# Patient Record
Sex: MALE | Race: BLACK OR AFRICAN AMERICAN | Employment: FULL TIME | URBAN - METROPOLITAN AREA
[De-identification: names, ages, dates, MRNs, and addresses within clinical notes are randomized per-mention and may not be internally consistent; named-entity substitution may affect disease eponyms.]

---

## 2019-12-03 ENCOUNTER — HOSPITAL ENCOUNTER (EMERGENCY)
Age: 29
Discharge: HOME | End: 2019-12-03
Attending: EMERGENCY MEDICINE
Payer: SELF-PAY

## 2019-12-03 ENCOUNTER — APPOINTMENT (OUTPATIENT)
Dept: GENERAL RADIOLOGY | Age: 29
End: 2019-12-03
Attending: PHYSICIAN ASSISTANT
Payer: SELF-PAY

## 2019-12-03 VITALS
HEIGHT: 71 IN | WEIGHT: 146.31 LBS | SYSTOLIC BLOOD PRESSURE: 128 MMHG | OXYGEN SATURATION: 93 % | TEMPERATURE: 97.5 F | DIASTOLIC BLOOD PRESSURE: 70 MMHG | HEART RATE: 80 BPM | BODY MASS INDEX: 20.48 KG/M2 | RESPIRATION RATE: 16 BRPM

## 2019-12-03 DIAGNOSIS — J45.21 MILD INTERMITTENT ASTHMA WITH ACUTE EXACERBATION: Primary | ICD-10-CM

## 2019-12-03 DIAGNOSIS — J06.9 UPPER RESPIRATORY TRACT INFECTION, UNSPECIFIED TYPE: ICD-10-CM

## 2019-12-03 DIAGNOSIS — J45.20 MILD INTERMITTENT ASTHMA WITHOUT COMPLICATION: ICD-10-CM

## 2019-12-03 PROCEDURE — 74011250636 HC RX REV CODE- 250/636: Performed by: PHYSICIAN ASSISTANT

## 2019-12-03 PROCEDURE — 77030029684 HC NEB SM VOL KT MONA -A

## 2019-12-03 PROCEDURE — 94640 AIRWAY INHALATION TREATMENT: CPT

## 2019-12-03 PROCEDURE — 71046 X-RAY EXAM CHEST 2 VIEWS: CPT

## 2019-12-03 PROCEDURE — 96372 THER/PROPH/DIAG INJ SC/IM: CPT

## 2019-12-03 PROCEDURE — 99283 EMERGENCY DEPT VISIT LOW MDM: CPT

## 2019-12-03 PROCEDURE — 74011250637 HC RX REV CODE- 250/637: Performed by: PHYSICIAN ASSISTANT

## 2019-12-03 PROCEDURE — 74011000250 HC RX REV CODE- 250: Performed by: PHYSICIAN ASSISTANT

## 2019-12-03 RX ORDER — IPRATROPIUM BROMIDE AND ALBUTEROL SULFATE 2.5; .5 MG/3ML; MG/3ML
3 SOLUTION RESPIRATORY (INHALATION)
Status: COMPLETED | OUTPATIENT
Start: 2019-12-03 | End: 2019-12-03

## 2019-12-03 RX ORDER — AZITHROMYCIN 250 MG/1
TABLET, FILM COATED ORAL
Qty: 6 TAB | Refills: 0 | Status: SHIPPED | OUTPATIENT
Start: 2019-12-03 | End: 2020-07-05

## 2019-12-03 RX ORDER — ALBUTEROL SULFATE 0.83 MG/ML
2.5 SOLUTION RESPIRATORY (INHALATION)
Qty: 30 NEBULE | Refills: 0 | Status: SHIPPED | OUTPATIENT
Start: 2019-12-03

## 2019-12-03 RX ORDER — ALBUTEROL SULFATE 90 UG/1
1 AEROSOL, METERED RESPIRATORY (INHALATION)
Qty: 1 INHALER | Refills: 0 | Status: SHIPPED | OUTPATIENT
Start: 2019-12-03

## 2019-12-03 RX ORDER — PREDNISONE 10 MG/1
TABLET ORAL
Qty: 21 TAB | Refills: 0 | Status: SHIPPED | OUTPATIENT
Start: 2019-12-04

## 2019-12-03 RX ORDER — DEXAMETHASONE SODIUM PHOSPHATE 100 MG/10ML
10 INJECTION INTRAMUSCULAR; INTRAVENOUS ONCE
Status: COMPLETED | OUTPATIENT
Start: 2019-12-03 | End: 2019-12-03

## 2019-12-03 RX ORDER — NEBULIZER AND COMPRESSOR
1 EACH MISCELLANEOUS
Qty: 1 EACH | Refills: 0 | Status: SHIPPED | OUTPATIENT
Start: 2019-12-03

## 2019-12-03 RX ORDER — ALBUTEROL SULFATE 90 UG/1
1 AEROSOL, METERED RESPIRATORY (INHALATION)
Status: COMPLETED | OUTPATIENT
Start: 2019-12-03 | End: 2019-12-03

## 2019-12-03 RX ORDER — IBUPROFEN 400 MG/1
800 TABLET ORAL
Status: COMPLETED | OUTPATIENT
Start: 2019-12-03 | End: 2019-12-03

## 2019-12-03 RX ADMIN — IPRATROPIUM BROMIDE AND ALBUTEROL SULFATE 3 ML: .5; 3 SOLUTION RESPIRATORY (INHALATION) at 21:03

## 2019-12-03 RX ADMIN — DEXAMETHASONE SODIUM PHOSPHATE 10 MG: 10 INJECTION INTRAMUSCULAR; INTRAVENOUS at 20:55

## 2019-12-03 RX ADMIN — IBUPROFEN 800 MG: 400 TABLET ORAL at 20:55

## 2019-12-03 RX ADMIN — ALBUTEROL SULFATE 1 PUFF: 90 AEROSOL, METERED RESPIRATORY (INHALATION) at 21:44

## 2019-12-03 NOTE — LETTER
[facility] EMERGENCY DEPT 
[address]  [address] 
[phone] Work/School Note Date: 12/3/2019 To Whom It May concern: 
 
Paul Whittington was seen and treated today in the emergency room by the following provider(s): 
Attending Provider: Nikolai Romero MD 
Physician Assistant: Hannah Stout PA-C. Paul Whittington may return to work on 12/5/19. Sincerely, Maia Lozano

## 2019-12-04 NOTE — DISCHARGE INSTRUCTIONS
Patient Education     Learning About Asthma Triggers  What are triggers? When you have asthma, certain things can make your symptoms worse. These are called triggers. They include:  · Cigarette smoke or air pollution. · Things you are allergic to, such as:  ¨ Pollen, mold, or dust mites. ¨ Pet hair, skin, or saliva. · Illnesses, like colds, flu, or pneumonia. · Exercise. · Dry, cold air. How do triggers affect asthma? Triggers can make it harder for your lungs to work as they should and can lead to sudden difficulty breathing and other symptoms. When you are around a trigger, an asthma attack is more likely. If your symptoms are severe, you may need emergency treatment or have to go to the hospital for treatment. If you know what your triggers are and can avoid them, you may be able to prevent asthma attacks, reduce how often you have them, and make them less severe. What can you do to avoid triggers? The first thing is to know your triggers. When you are having symptoms, note the things around you that might be causing them. Then look for patterns in what may be triggering your symptoms. Record your triggers on a piece of paper or in an asthma diary. When you have your list of possible triggers, work with your doctor to find ways to avoid them. You also can check how well your lungs are working by measuring your peak expiratory flow (PEF) throughout the day. Your PEF may drop when you are near things that trigger symptoms. Here are some ways to avoid a few common triggers. · Do not smoke or allow others to smoke around you. If you need help quitting, talk to your doctor about stop-smoking programs and medicines. These can increase your chances of quitting for good. · If there is a lot of pollution, pollen, or dust outside, stay at home and keep your windows closed. Use an air conditioner or air filter in your home.  Check your local weather report or newspaper for air quality and pollen reports. · Get a flu shot every year. Talk to your doctor about getting a pneumococcal shot. Wash your hands often to prevent infections. · Avoid exercising outdoors in cold weather. If you are outdoors in cold weather, wear a scarf around your face and breathe through your nose. How can you manage an asthma attack? · If you have an asthma action plan, follow the plan. In general:  ¨ Use your quick-relief inhaler as directed by your doctor. If your symptoms do not get better after you use your medicine, have someone take you to the emergency room. Call an ambulance if needed. ¨ If your doctor has given you other inhaled medicines or steroid pills, take them as directed. Where can you learn more? Go to PlanHQ.be  Enter M564 in the search box to learn more about \"Learning About Asthma Triggers. \"   © 6449-6376 Healthwise, Incorporated. Care instructions adapted under license by [facility] (which disclaims liability or warranty for this information). This care instruction is for use with your licensed healthcare professional. If you have questions about a medical condition or this instruction, always ask your healthcare professional. [place] [address] any warranty or liability for your use of this information. Content Version: 78.1.151301;  Last Revised: February 23, 2012

## 2019-12-04 NOTE — ED PROVIDER NOTES
EMERGENCY DEPARTMENT HISTORY AND PHYSICAL EXAM      Date: 12/3/2019  Patient Name: Stephanie Casillas    History of Presenting Illness     Chief Complaint   Patient presents with    Cold Symptoms     History Provided By: Patient    HPI: Stephanie Casillas, 34 y.o. male with asthma, tobacco abuse who presents ambulatory to the ED with cc of acute moderate persistent productive cough, chest tightness, shortness of breath, wheezing, body aches, chills, congestion X 4 days. Over-the-counter NyQuil and [facility] cold relief without relief. Last dose of NyQuil was just prior to arrival this evening. He endorses that he does not have an inhaler at home, but does endorse that he uses other peoples inhalers. No albuterol usage today. Denies known fever, chest pain, hemoptysis, sore throat, ear pain, lightheadedness, dizziness, palpitations, leg pain or swelling, abdominal pain, nausea, vomiting, lethargy, neck pain or stiffness. Patient denies receiving influenza vaccination this year. PCP: Evelyn Ivory MD    There are no other complaints, changes, or physical findings at this time. No current facility-administered medications on file prior to encounter. Current Outpatient Medications on File Prior to Encounter   Medication Sig Dispense Refill    cholecalciferol, VITAMIN D3, (VITAMIN D3) 5,000 unit tab tablet Take 1 Tab by mouth daily. Indications: VITAMIN D DEFICIENCY (HIGH DOSE THERAPY) 90 Tab 3    [DISCONTINUED] albuterol (PROVENTIL HFA, VENTOLIN HFA, PROAIR HFA) 90 mcg/actuation inhaler Take 1 Puff by inhalation every four (4) hours as needed for Wheezing. Indications: Acute Asthma Attack 1 Inhaler 3     Past History     Past Medical History:  Past Medical History:   Diagnosis Date    Asthma      Past Surgical History:  No past surgical history on file.   Family History:  Family History   Problem Relation Age of Onset    Diabetes Mother      Social History:  Social History     Tobacco Use    Smoking status: Former Smoker     Packs/day: 0.25     Last attempt to quit: 8/3/2016     Years since quitting: 3.3   Substance Use Topics    Alcohol use: Yes    Drug use: Yes     Types: Marijuana     Allergies:  No Known Allergies  Review of Systems   Review of Systems   Constitutional: Positive for chills and fatigue. Negative for activity change, appetite change, diaphoresis, fever and unexpected weight change. HENT: Positive for congestion and rhinorrhea. Negative for ear discharge, ear pain, mouth sores, sinus pressure, sinus pain, sneezing, sore throat, trouble swallowing and voice change. Eyes: Negative for visual disturbance. Respiratory: Positive for cough, chest tightness, shortness of breath and wheezing. Cardiovascular: Negative for chest pain, palpitations and leg swelling. Gastrointestinal: Negative for abdominal pain, constipation, diarrhea, nausea and vomiting. Genitourinary: Negative for decreased urine volume. Musculoskeletal: Positive for myalgias. Negative for arthralgias, back pain, neck pain and neck stiffness. Skin: Negative for color change and rash. Neurological: Negative for dizziness, syncope, weakness, light-headedness, numbness and headaches. Physical Exam   Physical Exam  Vitals signs and nursing note reviewed. Constitutional:       General: He is not in acute distress. Appearance: He is well-developed. He is not diaphoretic. HENT:      Head: Normocephalic and atraumatic. Right Ear: Hearing, tympanic membrane, ear canal and external ear normal.      Left Ear: Hearing, tympanic membrane, ear canal and external ear normal.      Nose: Mucosal edema and congestion present. No rhinorrhea. Right Sinus: No maxillary sinus tenderness or frontal sinus tenderness. Left Sinus: No maxillary sinus tenderness or frontal sinus tenderness. Mouth/Throat:      Mouth: Mucous membranes are moist.      Pharynx: Uvula midline.  No oropharyngeal exudate or posterior oropharyngeal erythema. Tonsils: No tonsillar abscesses. Eyes:      Conjunctiva/sclera: Conjunctivae normal.      Pupils: Pupils are equal, round, and reactive to light. Neck:      Musculoskeletal: Normal range of motion. Cardiovascular:      Rate and Rhythm: Normal rate and regular rhythm. Heart sounds: Normal heart sounds. Pulmonary:      Effort: Pulmonary effort is normal. No tachypnea, accessory muscle usage or respiratory distress. Breath sounds: Wheezing (Diffuse bilateral expiratory wheezing.) present. No decreased breath sounds, rhonchi or rales. Comments: Speaking in clear complete sentences. NAD. Mild intermittent dry cough. Abdominal:      General: Bowel sounds are normal. There is no distension. Palpations: Abdomen is soft. Abdomen is not rigid. Tenderness: There is no tenderness. There is no guarding or rebound. Negative signs include Crum's sign and McBurney's sign. Musculoskeletal: Normal range of motion. Skin:     General: Skin is warm and dry. Coloration: Skin is not pale. Neurological:      Mental Status: He is alert and oriented to person, place, and time. He is not disoriented. GCS: GCS eye subscore is 4. GCS verbal subscore is 5. GCS motor subscore is 6. Cranial Nerves: No cranial nerve deficit. Sensory: No sensory deficit. Motor: No tremor or seizure activity. Psychiatric:         Speech: Speech normal.         Behavior: Behavior normal.         Thought Content: Thought content normal.         Judgment: Judgment normal.       Diagnostic Study Results   Labs -   No results found for this or any previous visit (from the past 12 hour(s)). Radiologic Studies -   XR CHEST PA LAT   Final Result   IMPRESSION: Normal chest x-ray. Xr Chest Pa Lat    Result Date: 12/3/2019  IMPRESSION: Normal chest x-ray. Medical Decision Making   I am the first provider for this patient.     I reviewed the vital signs, available nursing notes, past medical history, past surgical history, family history and social history. Vital Signs-Reviewed the patient's vital signs. Patient Vitals for the past 12 hrs:   Temp Pulse Resp BP SpO2   12/03/19 2038 97.5 °F (36.4 °C) 80 16 132/69 91 %     Pulse Oximetry Analysis - 91% on RA    Records Reviewed: Nursing Notes, Old Medical Records, Previous Radiology Studies and Previous Laboratory Studies    Provider Notes (Medical Decision Making):   Patient presents with SOB and wheezing. DDx: flu, asthma, copd, pulmonary edema, acute bronchitis, ACS, pna. Will treat with nebs and solumedrol PRN and obtain labs, EKG and CXR PRN    10:01 PM  The patient has been re-examined after nebulizer treatments and states that they are feeling better and have no new complaints. On auscultation, wheezing is significantly improved. Laboratory tests, medications, x-rays, diagnosis, follow up plan and return instructions have been reviewed and discussed with the patient. The patient has had the opportunity to ask questions about their care. The patient expresses understanding and agreement with diagnosis, care plan; including oral steroids, nebulizer or inhaler use, follow up and return instructions. The patient agrees to return in 24 hours if their symptoms are not improving or immediately if they have any change in their condition including worsening wheezing or any signs of increasing work of breathing. Pt's symptom onset >72hrs pta. No antiviral therapy recommended at this time. No influenza screening indicated. ED Course:   Initial assessment performed. The patients presenting problems have been discussed, and they are in agreement with the care plan formulated and outlined with them. I have encouraged them to ask questions as they arise throughout their visit. ED Course as of Dec 03 2201   Tue Dec 03, 2019   2138 Pt resting comfortably in room in NAD. No new symptoms or complaints at this time. Available labs/ results reviewed with pt. Pulse oximetry on room air is 97%. Pt endorses he feels much better and would like to be discharged. Wheezing improved in bilateral lung fields. [SM]   2200 Pt sleeping in room in NAD. [SM]      ED Course User Index  [SM] Don Santamaria PA-C       Progress Note:   Updated pt on all returned results and findings. Discussed the importance of proper follow up as referred below along with return precautions. Pt in agreement with the care plan and expresses agreement with and understanding of all items discussed. Disposition:  10:01 PM  I have discussed with patient their diagnosis, treatment, and follow up plan. The patient agrees to follow up as outlined in discharge paperwork and also to return to the ED with any worsening. Cirilo Wright PA-C      PLAN:  1. Current Discharge Medication List      START taking these medications    Details   azithromycin (ZITHROMAX) 250 mg tablet Take two tablets today then one tablet daily  Qty: 6 Tab, Refills: 0      albuterol (PROVENTIL VENTOLIN) 2.5 mg /3 mL (0.083 %) nebu 3 mL by Nebulization route every four (4) hours as needed for Cough. Qty: 30 Nebule, Refills: 0      Nebulizer & Compressor machine 1 Each by Does Not Apply route every four to six (4-6) hours as needed for Cough. Qty: 1 Each, Refills: 0      predniSONE (STERAPRED DS) 10 mg dose pack See administration instruction per 10mg dose pack  Qty: 21 Tab, Refills: 0         CONTINUE these medications which have CHANGED    Details   albuterol (PROVENTIL HFA, VENTOLIN HFA, PROAIR HFA) 90 mcg/actuation inhaler Take 1 Puff by inhalation every four (4) hours as needed for Wheezing. Indications: Asthma Attack  Qty: 1 Inhaler, Refills: 0    Associated Diagnoses: Mild intermittent asthma without complication         CONTINUE these medications which have NOT CHANGED    Details   cholecalciferol, VITAMIN D3, (VITAMIN D3) 5,000 unit tab tablet Take 1 Tab by mouth daily. Indications: VITAMIN D DEFICIENCY (HIGH DOSE THERAPY)  Qty: 90 Tab, Refills: 3    Associated Diagnoses: Hypovitaminosis D           2. Follow-up Information     Follow up With Specialties Details Why Contact Info    Kiesha He MD Internal Medicine Schedule an appointment as soon as possible for a visit in 3 days As needed [address] [facility] [address]  [address]  [phone]          Return to ED if worse     Diagnosis     Clinical Impression:   1. Mild intermittent asthma with acute exacerbation    2. Upper respiratory tract infection, unspecified type    3. Mild intermittent asthma without complication            Please note that this dictation was completed with Dragon, computer voice recognition software. Quite often unanticipated grammatical, syntax, homophones, and other interpretive errors are inadvertently transcribed by the computer software. Please disregard these errors. Additionally, please excuse any errors that have escaped final proofreading. stated

## 2019-12-04 NOTE — ED NOTES
Pt reporting wheezing, chills, sore throat, generalized body aches x4days. Pt 91%-93% on room air. He does not have nebs or MDI at home. Safety precautions in place, call bell within reach, bed locked and in lowest position. Shortness of breath: Yes  Dyspnea during exertion: Yes  Chest rise/fall: Equal; symmetrical  Lung sounds: Expiratory wheezing all posterior lobes  Capillary refill: <3secs all extremities x4  Pertinent hx: Asthma      Emergency Department Nursing Plan of Care       The Nursing Plan of Care is developed from the Nursing assessment and Emergency Department Attending provider initial evaluation. The plan of care may be reviewed in the ED Provider note.     The Plan of Care was developed with the following considerations:   Patient / Family readiness to learn indicated by:verbalized understanding  Persons(s) to be included in education: patient  Barriers to Learning/Limitations:No    Signed     Maria A Chamorro RN    12/3/2019   10:26 PM

## 2019-12-04 NOTE — ED NOTES
Patient  given copy of dc instructions and 5 script(s). Patient  verbalized understanding of instructions and script (s). Patient given a current medication reconciliation form and verbalized understanding of their medications. Patient  verbalized understanding of the importance of discussing medications with  his or her physician or clinic they will be following up with. Patient alert and oriented and in no acute distress. Patient discharged home ambulatory .

## 2020-07-05 ENCOUNTER — APPOINTMENT (OUTPATIENT)
Dept: VASCULAR SURGERY | Age: 30
End: 2020-07-05
Attending: EMERGENCY MEDICINE
Payer: COMMERCIAL

## 2020-07-05 ENCOUNTER — APPOINTMENT (OUTPATIENT)
Dept: GENERAL RADIOLOGY | Age: 30
End: 2020-07-05
Attending: EMERGENCY MEDICINE
Payer: COMMERCIAL

## 2020-07-05 ENCOUNTER — HOSPITAL ENCOUNTER (EMERGENCY)
Age: 30
Discharge: HOME | End: 2020-07-05
Attending: EMERGENCY MEDICINE
Payer: COMMERCIAL

## 2020-07-05 VITALS
SYSTOLIC BLOOD PRESSURE: 133 MMHG | HEART RATE: 105 BPM | DIASTOLIC BLOOD PRESSURE: 64 MMHG | WEIGHT: 170 LBS | OXYGEN SATURATION: 96 % | RESPIRATION RATE: 18 BRPM | BODY MASS INDEX: 23.8 KG/M2 | TEMPERATURE: 99.6 F | HEIGHT: 71 IN

## 2020-07-05 DIAGNOSIS — L03.115 CELLULITIS OF RIGHT LEG: Primary | ICD-10-CM

## 2020-07-05 LAB
ANION GAP SERPL CALC-SCNC: 8 MMOL/L (ref 5–15)
BASOPHILS # BLD: 0 K/UL (ref 0–0.1)
BASOPHILS NFR BLD: 0 % (ref 0–1)
BUN SERPL-MCNC: 16 MG/DL (ref 6–20)
BUN/CREAT SERPL: 16 (ref 12–20)
CALCIUM SERPL-MCNC: 8.3 MG/DL (ref 8.5–10.1)
CHLORIDE SERPL-SCNC: 103 MMOL/L (ref 97–108)
CO2 SERPL-SCNC: 29 MMOL/L (ref 21–32)
CREAT SERPL-MCNC: 1 MG/DL (ref 0.7–1.3)
DIFFERENTIAL METHOD BLD: ABNORMAL
EOSINOPHIL # BLD: 0.3 K/UL (ref 0–0.4)
EOSINOPHIL NFR BLD: 3 % (ref 0–7)
ERYTHROCYTE [DISTWIDTH] IN BLOOD BY AUTOMATED COUNT: 14.3 % (ref 11.5–14.5)
GLUCOSE SERPL-MCNC: 90 MG/DL (ref 65–100)
HCT VFR BLD AUTO: 38.3 % (ref 36.6–50.3)
HGB BLD-MCNC: 12.5 G/DL (ref 12.1–17)
IMM GRANULOCYTES # BLD AUTO: 0 K/UL (ref 0–0.04)
IMM GRANULOCYTES NFR BLD AUTO: 0 % (ref 0–0.5)
LYMPHOCYTES # BLD: 2.1 K/UL (ref 0.8–3.5)
LYMPHOCYTES NFR BLD: 21 % (ref 12–49)
MCH RBC QN AUTO: 28 PG (ref 26–34)
MCHC RBC AUTO-ENTMCNC: 32.6 G/DL (ref 30–36.5)
MCV RBC AUTO: 85.7 FL (ref 80–99)
MONOCYTES # BLD: 1.2 K/UL (ref 0–1)
MONOCYTES NFR BLD: 12 % (ref 5–13)
NEUTS SEG # BLD: 6.6 K/UL (ref 1.8–8)
NEUTS SEG NFR BLD: 64 % (ref 32–75)
NRBC # BLD: 0 K/UL (ref 0–0.01)
NRBC BLD-RTO: 0 PER 100 WBC
PLATELET # BLD AUTO: 225 K/UL (ref 150–400)
PMV BLD AUTO: 8.7 FL (ref 8.9–12.9)
POTASSIUM SERPL-SCNC: 3.8 MMOL/L (ref 3.5–5.1)
RBC # BLD AUTO: 4.47 M/UL (ref 4.1–5.7)
SODIUM SERPL-SCNC: 140 MMOL/L (ref 136–145)
WBC # BLD AUTO: 10.2 K/UL (ref 4.1–11.1)

## 2020-07-05 PROCEDURE — 85025 COMPLETE CBC W/AUTO DIFF WBC: CPT

## 2020-07-05 PROCEDURE — 74011250637 HC RX REV CODE- 250/637: Performed by: EMERGENCY MEDICINE

## 2020-07-05 PROCEDURE — 87040 BLOOD CULTURE FOR BACTERIA: CPT

## 2020-07-05 PROCEDURE — 93971 EXTREMITY STUDY: CPT

## 2020-07-05 PROCEDURE — 73610 X-RAY EXAM OF ANKLE: CPT

## 2020-07-05 PROCEDURE — 73630 X-RAY EXAM OF FOOT: CPT

## 2020-07-05 PROCEDURE — 73590 X-RAY EXAM OF LOWER LEG: CPT

## 2020-07-05 PROCEDURE — 80048 BASIC METABOLIC PNL TOTAL CA: CPT

## 2020-07-05 PROCEDURE — 74011250636 HC RX REV CODE- 250/636: Performed by: EMERGENCY MEDICINE

## 2020-07-05 PROCEDURE — 99284 EMERGENCY DEPT VISIT MOD MDM: CPT

## 2020-07-05 PROCEDURE — 96374 THER/PROPH/DIAG INJ IV PUSH: CPT

## 2020-07-05 PROCEDURE — 36415 COLL VENOUS BLD VENIPUNCTURE: CPT

## 2020-07-05 RX ORDER — KETOROLAC TROMETHAMINE 30 MG/ML
15 INJECTION, SOLUTION INTRAMUSCULAR; INTRAVENOUS
Status: COMPLETED | OUTPATIENT
Start: 2020-07-05 | End: 2020-07-05

## 2020-07-05 RX ORDER — IBUPROFEN 800 MG/1
800 TABLET ORAL
Qty: 20 TAB | Refills: 0 | Status: SHIPPED | OUTPATIENT
Start: 2020-07-05 | End: 2020-07-12

## 2020-07-05 RX ORDER — ACETAMINOPHEN 500 MG
1000 TABLET ORAL ONCE
Status: COMPLETED | OUTPATIENT
Start: 2020-07-05 | End: 2020-07-05

## 2020-07-05 RX ORDER — CEPHALEXIN 500 MG/1
1000 CAPSULE ORAL 3 TIMES DAILY
Qty: 42 CAP | Refills: 0 | Status: SHIPPED | OUTPATIENT
Start: 2020-07-05 | End: 2020-07-12

## 2020-07-05 RX ADMIN — KETOROLAC TROMETHAMINE 15 MG: 30 INJECTION, SOLUTION INTRAMUSCULAR; INTRAVENOUS at 18:28

## 2020-07-05 RX ADMIN — ACETAMINOPHEN 1000 MG: 500 TABLET, FILM COATED ORAL at 18:28

## 2020-07-05 NOTE — ED NOTES
Emergency Department Nursing Plan of Care       The Nursing Plan of Care is developed from the Nursing assessment and Emergency Department Attending provider initial evaluation. The plan of care may be reviewed in the ED Provider note.     The Plan of Care was developed with the following considerations:   Patient / Family readiness to learn indicated by:verbalized understanding  Persons(s) to be included in education: patient  Barriers to Learning/Limitations:No    Signed     Andrade Car    7/5/2020   5:32 PM

## 2020-07-05 NOTE — ED PROVIDER NOTES
EMERGENCY DEPARTMENT HISTORY AND PHYSICAL EXAM      Date: 7/5/2020  Patient Name: Trenton Erazo    History of Presenting Illness     Chief Complaint   Patient presents with    Leg Swelling     right foot and lower leg x1 day without injury, pt;s foot noted to be swollen, red, and warm, pt reports 10/10 pain       History Provided By: Patient    HPI: Trenton Erazo, 27 y.o. male presents to the ED with cc of right leg pain and swelling over the past 1 to 2 days. Symptoms have progressively worsened since onset over the weekend. He is able to ambulate but with some difficulty as this does worsen his pain. He notes increased swelling mostly at the foot. He denies any fevers, chills, nausea, vomiting, or any other symptoms. No recent injury or trauma to this leg. No prior history of DVT or PE. He has not been taking anything at home for symptomatic relief. There are no other complaints, changes, or physical findings at this time. PCP: Erika Dorsey MD    No current facility-administered medications on file prior to encounter. Current Outpatient Medications on File Prior to Encounter   Medication Sig Dispense Refill    albuterol (PROVENTIL HFA, VENTOLIN HFA, PROAIR HFA) 90 mcg/actuation inhaler Take 1 Puff by inhalation every four (4) hours as needed for Wheezing. Indications: Asthma Attack 1 Inhaler 0    albuterol (PROVENTIL VENTOLIN) 2.5 mg /3 mL (0.083 %) nebu 3 mL by Nebulization route every four (4) hours as needed for Cough. 30 Nebule 0    Nebulizer & Compressor machine 1 Each by Does Not Apply route every four to six (4-6) hours as needed for Cough. 1 Each 0    predniSONE (STERAPRED DS) 10 mg dose pack See administration instruction per 10mg dose pack 21 Tab 0    [DISCONTINUED] azithromycin (ZITHROMAX) 250 mg tablet Take two tablets today then one tablet daily 6 Tab 0    cholecalciferol, VITAMIN D3, (VITAMIN D3) 5,000 unit tab tablet Take 1 Tab by mouth daily.  Indications: VITAMIN D DEFICIENCY (HIGH DOSE THERAPY) 90 Tab 3       Past History     Past Medical History:  Past Medical History:   Diagnosis Date    Asthma        Past Surgical History:  History reviewed. No pertinent surgical history. Family History:  Family History   Problem Relation Age of Onset    Diabetes Mother        Social History:  Social History     Tobacco Use    Smoking status: Former Smoker     Packs/day: 0.25     Last attempt to quit: 8/3/2016     Years since quitting: 3.9   Substance Use Topics    Alcohol use: Yes    Drug use: Yes     Types: Marijuana       Allergies:  No Known Allergies      Review of Systems   Review of Systems   Constitutional: Negative for chills and fever. Respiratory: Negative for cough and shortness of breath. Cardiovascular: Positive for leg swelling. Gastrointestinal: Negative for nausea and vomiting. Skin: Positive for color change. Neurological: Negative for weakness and numbness. All other systems reviewed and are negative. Physical Exam   Physical Exam  Vitals signs and nursing note reviewed. Constitutional:       General: He is not in acute distress. Appearance: Normal appearance. He is not ill-appearing, toxic-appearing or diaphoretic. HENT:      Head: Normocephalic and atraumatic. Cardiovascular:      Rate and Rhythm: Regular rhythm. Tachycardia present. Heart sounds: Normal heart sounds. No murmur. Pulmonary:      Effort: Pulmonary effort is normal. No respiratory distress. Breath sounds: Normal breath sounds. No wheezing. Abdominal:      Palpations: Abdomen is soft. Tenderness: There is no abdominal tenderness. There is no guarding or rebound. Musculoskeletal:      Comments: Right lower extremity with mild generalized edema nonpitting with some tenderness to palpation. Mild erythema when compared to the left leg. Mild warmth when compared to the left leg. 2+ DP pulse. Neurovascular intact distally.    Skin:     General: Skin is warm and dry.      Findings: No erythema or rash. Neurological:      General: No focal deficit present. Mental Status: He is alert and oriented to person, place, and time. Diagnostic Study Results     Labs -  Labs Reviewed   CBC WITH AUTOMATED DIFF - Abnormal; Notable for the following components:       Result Value    MPV 8.7 (*)     ABS. MONOCYTES 1.2 (*)     All other components within normal limits   METABOLIC PANEL, BASIC - Abnormal; Notable for the following components:    Calcium 8.3 (*)     All other components within normal limits   CULTURE, BLOOD, PAIRED       Radiologic Studies -   No orders to display     CT Results  (Last 48 hours)    None        CXR Results  (Last 48 hours)    None          Medical Decision Making   I am the first provider for this patient. I reviewed the vital signs, available nursing notes, past medical history, past surgical history, family history and social history. Vital Signs-Reviewed the patient's vital signs. Patient Vitals for the past 12 hrs:   Temp Pulse Resp BP SpO2   07/05/20 1723     96 %   07/05/20 1723  (!) 105      07/05/20 1709 99.6 °F (37.6 °C) (!) 124 18 133/64 94 %       Records Reviewed: Nursing Notes    Provider Notes (Medical Decision Making):   77-year-old male here with asymmetric right leg pain and swelling, nontraumatic. On examination patient clinically appears well and nontoxic. He is noted to have mild tachycardia at 105 bpm, low-grade temperature 99.6 °F.  Remainder vital signs unremarkable. He has no complaints of chest pain or shortness of breath. Plain films of right lower extremity foot, ankle, tib-fib unremarkable for foreign body, subcutaneous gas, or bony abnormality. Ultrasound DVT study negative for DVT. There is no significant leukocytosis or other abnormality on blood work.   Symptoms likely consistent with cellulitis and patient will be treated with outpatient antibiotics, encouraged follow-up closely with PCP and given strict return ED precautions. All questions answered and he agrees plan as above. ED Course:   Initial assessment performed. The patients presenting problems have been discussed, and they are in agreement with the care plan formulated and outlined with them. I have encouraged them to ask questions as they arise throughout their visit. Discharge Note:  The patient has been re-evaluated and is ready for discharge. Reviewed available results with patient. Counseled patient on diagnosis and care plan. Patient has expressed understanding, and all questions have been answered. Patient agrees with plan and agrees to follow up as recommended, or to return to the ED if their symptoms worsen. Discharge instructions have been provided and explained to the patient, along with reasons to return to the ED. Disposition:  Discharge home    DISCHARGE PLAN:  1. Current Discharge Medication List        2. Follow-up Information    None       3. Return to ED if worse     Diagnosis     Clinical Impression: No diagnosis found. Attestations:    Hailee Ortega MD    Please note that this dictation was completed with [employer], the computer voice recognition software. Quite often unanticipated grammatical, syntax, homophones, and other interpretive errors are inadvertently transcribed by the computer software. Please disregard these errors. Please excuse any errors that have escaped final proofreading. Thank you.

## 2020-07-05 NOTE — ED NOTES
Patient complains of right lower leg swelling x 2 days. Complains of pain especially with ambulating. Denies smoking or sedentary lifestyle. Denies hx of blood clots. Denies fever or chills. Patient is alert and oriented x 4 and in no acute distress at this time. Respirations are at a regular rate, depth, and pattern. Patient updated on plan of care and has no questions or concerns at this time. Call bell within reach. Will continue to monitor. Please reference nursing assessment. See PVS assessment.

## 2020-07-05 NOTE — ED NOTES
Discharge instructions were given to the patient by JANNETTE Calvo RN. .     The patient left the Emergency Department ambulatory, alert and oriented and in no acute distress with 2 prescription(s). The patient was encouraged to call or return to the ED for worsening symptoms or problems and was encouraged to schedule a follow up appointment for continuing care. Patient leaving ED accompanied by self. The patient verbalized understanding of discharge instructions and prescriptions, all questions were answered. The patient has no further concerns at this time. Patient declined wheelchair transfer upon ED discharge.

## 2020-07-10 LAB
BACTERIA SPEC CULT: NORMAL
SERVICE CMNT-IMP: NORMAL

## 2022-12-31 ENCOUNTER — HOSPITAL ENCOUNTER (INPATIENT)
Age: 32
LOS: 1 days | Discharge: LEFT BEFORE DISCHARGE | DRG: 720 | End: 2023-01-01
Attending: STUDENT IN AN ORGANIZED HEALTH CARE EDUCATION/TRAINING PROGRAM | Admitting: INTERNAL MEDICINE
Payer: COMMERCIAL

## 2022-12-31 ENCOUNTER — APPOINTMENT (OUTPATIENT)
Dept: GENERAL RADIOLOGY | Age: 32
DRG: 720 | End: 2022-12-31
Attending: PHYSICIAN ASSISTANT
Payer: COMMERCIAL

## 2022-12-31 DIAGNOSIS — Z72.0 TOBACCO ABUSE: ICD-10-CM

## 2022-12-31 DIAGNOSIS — A41.9 SEPSIS WITH ACUTE HYPOXIC RESPIRATORY FAILURE WITHOUT SEPTIC SHOCK, DUE TO UNSPECIFIED ORGANISM (HCC): Primary | ICD-10-CM

## 2022-12-31 DIAGNOSIS — R65.20 SEPSIS WITH ACUTE HYPOXIC RESPIRATORY FAILURE WITHOUT SEPTIC SHOCK, DUE TO UNSPECIFIED ORGANISM (HCC): Primary | ICD-10-CM

## 2022-12-31 DIAGNOSIS — J45.21 MILD INTERMITTENT ASTHMA WITH ACUTE EXACERBATION: ICD-10-CM

## 2022-12-31 DIAGNOSIS — J96.01 SEPSIS WITH ACUTE HYPOXIC RESPIRATORY FAILURE WITHOUT SEPTIC SHOCK, DUE TO UNSPECIFIED ORGANISM (HCC): Primary | ICD-10-CM

## 2022-12-31 DIAGNOSIS — J45.20 MILD INTERMITTENT ASTHMA WITHOUT COMPLICATION: ICD-10-CM

## 2022-12-31 DIAGNOSIS — J18.9 PNEUMONIA OF BOTH LUNGS DUE TO INFECTIOUS ORGANISM, UNSPECIFIED PART OF LUNG: ICD-10-CM

## 2022-12-31 LAB
ALBUMIN SERPL-MCNC: 3 G/DL (ref 3.5–5)
ALBUMIN/GLOB SERPL: 0.6 (ref 1.1–2.2)
ALP SERPL-CCNC: 126 U/L (ref 45–117)
ALT SERPL-CCNC: 48 U/L (ref 12–78)
AMPHET UR QL SCN: NEGATIVE
ANION GAP SERPL CALC-SCNC: 9 MMOL/L (ref 5–15)
APPEARANCE UR: CLEAR
AST SERPL-CCNC: 31 U/L (ref 15–37)
BACTERIA URNS QL MICRO: NEGATIVE /HPF
BARBITURATES UR QL SCN: NEGATIVE
BASOPHILS # BLD: 0 K/UL (ref 0–0.1)
BASOPHILS NFR BLD: 0 % (ref 0–1)
BENZODIAZ UR QL: NEGATIVE
BILIRUB SERPL-MCNC: 0.4 MG/DL (ref 0.2–1)
BILIRUB UR QL CFM: NEGATIVE
BUN SERPL-MCNC: 12 MG/DL (ref 6–20)
BUN/CREAT SERPL: 14 (ref 12–20)
CALCIUM SERPL-MCNC: 8.7 MG/DL (ref 8.5–10.1)
CANNABINOIDS UR QL SCN: POSITIVE
CHLORIDE SERPL-SCNC: 96 MMOL/L (ref 97–108)
CO2 SERPL-SCNC: 29 MMOL/L (ref 21–32)
COCAINE UR QL SCN: POSITIVE
COLOR UR: ABNORMAL
CREAT SERPL-MCNC: 0.84 MG/DL (ref 0.7–1.3)
DIFFERENTIAL METHOD BLD: ABNORMAL
DRUG SCRN COMMENT,DRGCM: ABNORMAL
EOSINOPHIL # BLD: 0.1 K/UL (ref 0–0.4)
EOSINOPHIL NFR BLD: 1 % (ref 0–7)
EPITH CASTS URNS QL MICRO: ABNORMAL /LPF
ERYTHROCYTE [DISTWIDTH] IN BLOOD BY AUTOMATED COUNT: 14.9 % (ref 11.5–14.5)
FLUAV RNA SPEC QL NAA+PROBE: NOT DETECTED
FLUBV RNA SPEC QL NAA+PROBE: NOT DETECTED
GLOBULIN SER CALC-MCNC: 5.3 G/DL (ref 2–4)
GLUCOSE BLD STRIP.AUTO-MCNC: 95 MG/DL (ref 65–117)
GLUCOSE SERPL-MCNC: 91 MG/DL (ref 65–100)
GLUCOSE UR STRIP.AUTO-MCNC: NEGATIVE MG/DL
HCT VFR BLD AUTO: 34.8 % (ref 36.6–50.3)
HGB BLD-MCNC: 11.7 G/DL (ref 12.1–17)
HGB UR QL STRIP: NEGATIVE
IMM GRANULOCYTES # BLD AUTO: 0.1 K/UL (ref 0–0.04)
IMM GRANULOCYTES NFR BLD AUTO: 1 % (ref 0–0.5)
INR PPP: 1.1 (ref 0.9–1.1)
KETONES UR QL STRIP.AUTO: ABNORMAL MG/DL
LACTATE SERPL-SCNC: 0.6 MMOL/L (ref 0.4–2)
LEUKOCYTE ESTERASE UR QL STRIP.AUTO: ABNORMAL
LYMPHOCYTES # BLD: 1.8 K/UL (ref 0.8–3.5)
LYMPHOCYTES NFR BLD: 16 % (ref 12–49)
MAGNESIUM SERPL-MCNC: 1.7 MG/DL (ref 1.6–2.4)
MCH RBC QN AUTO: 27.3 PG (ref 26–34)
MCHC RBC AUTO-ENTMCNC: 33.6 G/DL (ref 30–36.5)
MCV RBC AUTO: 81.3 FL (ref 80–99)
METHADONE UR QL: NEGATIVE
MONOCYTES # BLD: 1.1 K/UL (ref 0–1)
MONOCYTES NFR BLD: 9 % (ref 5–13)
NEUTS SEG # BLD: 8.7 K/UL (ref 1.8–8)
NEUTS SEG NFR BLD: 73 % (ref 32–75)
NITRITE UR QL STRIP.AUTO: NEGATIVE
NRBC # BLD: 0 K/UL (ref 0–0.01)
NRBC BLD-RTO: 0 PER 100 WBC
OPIATES UR QL: POSITIVE
PCP UR QL: NEGATIVE
PH UR STRIP: 6 (ref 5–8)
PLATELET # BLD AUTO: 428 K/UL (ref 150–400)
PMV BLD AUTO: 9.1 FL (ref 8.9–12.9)
POTASSIUM SERPL-SCNC: 3.6 MMOL/L (ref 3.5–5.1)
PROT SERPL-MCNC: 8.3 G/DL (ref 6.4–8.2)
PROT UR STRIP-MCNC: 100 MG/DL
PROTHROMBIN TIME: 11.1 SEC (ref 9–11.1)
RBC # BLD AUTO: 4.28 M/UL (ref 4.1–5.7)
RBC #/AREA URNS HPF: ABNORMAL /HPF (ref 0–5)
SARS-COV-2, COV2: NOT DETECTED
SERVICE CMNT-IMP: NORMAL
SODIUM SERPL-SCNC: 134 MMOL/L (ref 136–145)
SP GR UR REFRACTOMETRY: 1.03
TROPONIN-HIGH SENSITIVITY: 8 NG/L (ref 0–76)
UA: UC IF INDICATED,UAUC: ABNORMAL
UROBILINOGEN UR QL STRIP.AUTO: 1 EU/DL (ref 0.2–1)
WBC # BLD AUTO: 11.9 K/UL (ref 4.1–11.1)
WBC URNS QL MICRO: ABNORMAL /HPF (ref 0–4)

## 2022-12-31 PROCEDURE — 93005 ELECTROCARDIOGRAM TRACING: CPT

## 2022-12-31 PROCEDURE — 74011000250 HC RX REV CODE- 250: Performed by: PHYSICIAN ASSISTANT

## 2022-12-31 PROCEDURE — 87070 CULTURE OTHR SPECIMN AEROBIC: CPT

## 2022-12-31 PROCEDURE — 65270000032 HC RM SEMIPRIVATE

## 2022-12-31 PROCEDURE — 96365 THER/PROPH/DIAG IV INF INIT: CPT

## 2022-12-31 PROCEDURE — 74011250636 HC RX REV CODE- 250/636: Performed by: INTERNAL MEDICINE

## 2022-12-31 PROCEDURE — 96375 TX/PRO/DX INJ NEW DRUG ADDON: CPT

## 2022-12-31 PROCEDURE — 0202U NFCT DS 22 TRGT SARS-COV-2: CPT

## 2022-12-31 PROCEDURE — 74011250636 HC RX REV CODE- 250/636: Performed by: PHYSICIAN ASSISTANT

## 2022-12-31 PROCEDURE — 99285 EMERGENCY DEPT VISIT HI MDM: CPT

## 2022-12-31 PROCEDURE — 94640 AIRWAY INHALATION TREATMENT: CPT

## 2022-12-31 PROCEDURE — 81001 URINALYSIS AUTO W/SCOPE: CPT

## 2022-12-31 PROCEDURE — 80307 DRUG TEST PRSMV CHEM ANLYZR: CPT

## 2022-12-31 PROCEDURE — 85610 PROTHROMBIN TIME: CPT

## 2022-12-31 PROCEDURE — 87040 BLOOD CULTURE FOR BACTERIA: CPT

## 2022-12-31 PROCEDURE — 85025 COMPLETE CBC W/AUTO DIFF WBC: CPT

## 2022-12-31 PROCEDURE — 80053 COMPREHEN METABOLIC PANEL: CPT

## 2022-12-31 PROCEDURE — 96367 TX/PROPH/DG ADDL SEQ IV INF: CPT

## 2022-12-31 PROCEDURE — 74011250637 HC RX REV CODE- 250/637: Performed by: PHYSICIAN ASSISTANT

## 2022-12-31 PROCEDURE — 74011000250 HC RX REV CODE- 250: Performed by: INTERNAL MEDICINE

## 2022-12-31 PROCEDURE — 83605 ASSAY OF LACTIC ACID: CPT

## 2022-12-31 PROCEDURE — G0378 HOSPITAL OBSERVATION PER HR: HCPCS

## 2022-12-31 PROCEDURE — 82962 GLUCOSE BLOOD TEST: CPT

## 2022-12-31 PROCEDURE — 71045 X-RAY EXAM CHEST 1 VIEW: CPT

## 2022-12-31 PROCEDURE — 36415 COLL VENOUS BLD VENIPUNCTURE: CPT

## 2022-12-31 PROCEDURE — 83735 ASSAY OF MAGNESIUM: CPT

## 2022-12-31 PROCEDURE — 87636 SARSCOV2 & INF A&B AMP PRB: CPT

## 2022-12-31 PROCEDURE — 84484 ASSAY OF TROPONIN QUANT: CPT

## 2022-12-31 PROCEDURE — 77010033678 HC OXYGEN DAILY

## 2022-12-31 RX ORDER — SODIUM CHLORIDE, SODIUM LACTATE, POTASSIUM CHLORIDE, CALCIUM CHLORIDE 600; 310; 30; 20 MG/100ML; MG/100ML; MG/100ML; MG/100ML
75 INJECTION, SOLUTION INTRAVENOUS CONTINUOUS
Status: DISCONTINUED | OUTPATIENT
Start: 2022-12-31 | End: 2023-01-01 | Stop reason: HOSPADM

## 2022-12-31 RX ORDER — POLYETHYLENE GLYCOL 3350 17 G/17G
17 POWDER, FOR SOLUTION ORAL DAILY PRN
Status: DISCONTINUED | OUTPATIENT
Start: 2022-12-31 | End: 2023-01-01 | Stop reason: HOSPADM

## 2022-12-31 RX ORDER — IPRATROPIUM BROMIDE AND ALBUTEROL SULFATE 2.5; .5 MG/3ML; MG/3ML
3 SOLUTION RESPIRATORY (INHALATION)
Status: DISCONTINUED | OUTPATIENT
Start: 2022-12-31 | End: 2023-01-01 | Stop reason: HOSPADM

## 2022-12-31 RX ORDER — ONDANSETRON 4 MG/1
4 TABLET, ORALLY DISINTEGRATING ORAL
Status: DISCONTINUED | OUTPATIENT
Start: 2022-12-31 | End: 2023-01-01 | Stop reason: HOSPADM

## 2022-12-31 RX ORDER — IPRATROPIUM BROMIDE AND ALBUTEROL SULFATE 2.5; .5 MG/3ML; MG/3ML
3 SOLUTION RESPIRATORY (INHALATION)
Status: COMPLETED | OUTPATIENT
Start: 2022-12-31 | End: 2022-12-31

## 2022-12-31 RX ORDER — ACETAMINOPHEN 500 MG
1000 TABLET ORAL
Status: COMPLETED | OUTPATIENT
Start: 2022-12-31 | End: 2022-12-31

## 2022-12-31 RX ORDER — SODIUM CHLORIDE 0.9 % (FLUSH) 0.9 %
5-40 SYRINGE (ML) INJECTION EVERY 8 HOURS
Status: DISCONTINUED | OUTPATIENT
Start: 2022-12-31 | End: 2023-01-01 | Stop reason: HOSPADM

## 2022-12-31 RX ORDER — ONDANSETRON 2 MG/ML
4 INJECTION INTRAMUSCULAR; INTRAVENOUS
Status: DISCONTINUED | OUTPATIENT
Start: 2022-12-31 | End: 2023-01-01 | Stop reason: HOSPADM

## 2022-12-31 RX ORDER — ACETAMINOPHEN 650 MG/1
650 SUPPOSITORY RECTAL
Status: DISCONTINUED | OUTPATIENT
Start: 2022-12-31 | End: 2023-01-01 | Stop reason: HOSPADM

## 2022-12-31 RX ORDER — ACETAMINOPHEN 325 MG/1
650 TABLET ORAL
Status: DISCONTINUED | OUTPATIENT
Start: 2022-12-31 | End: 2023-01-01 | Stop reason: HOSPADM

## 2022-12-31 RX ORDER — ALBUTEROL SULFATE 90 UG/1
6 AEROSOL, METERED RESPIRATORY (INHALATION)
Status: COMPLETED | OUTPATIENT
Start: 2022-12-31 | End: 2022-12-31

## 2022-12-31 RX ORDER — ENOXAPARIN SODIUM 100 MG/ML
40 INJECTION SUBCUTANEOUS DAILY
Status: DISCONTINUED | OUTPATIENT
Start: 2023-01-01 | End: 2023-01-01 | Stop reason: HOSPADM

## 2022-12-31 RX ORDER — SODIUM CHLORIDE 0.9 % (FLUSH) 0.9 %
5-10 SYRINGE (ML) INJECTION AS NEEDED
Status: DISCONTINUED | OUTPATIENT
Start: 2022-12-31 | End: 2023-01-01 | Stop reason: HOSPADM

## 2022-12-31 RX ORDER — SODIUM CHLORIDE 0.9 % (FLUSH) 0.9 %
5-40 SYRINGE (ML) INJECTION AS NEEDED
Status: DISCONTINUED | OUTPATIENT
Start: 2022-12-31 | End: 2023-01-01 | Stop reason: HOSPADM

## 2022-12-31 RX ORDER — MAGNESIUM SULFATE HEPTAHYDRATE 40 MG/ML
2 INJECTION, SOLUTION INTRAVENOUS
Status: COMPLETED | OUTPATIENT
Start: 2022-12-31 | End: 2022-12-31

## 2022-12-31 RX ADMIN — SODIUM CHLORIDE, POTASSIUM CHLORIDE, SODIUM LACTATE AND CALCIUM CHLORIDE 75 ML/HR: 600; 310; 30; 20 INJECTION, SOLUTION INTRAVENOUS at 18:00

## 2022-12-31 RX ADMIN — SODIUM CHLORIDE, POTASSIUM CHLORIDE, SODIUM LACTATE AND CALCIUM CHLORIDE 1000 ML: 600; 310; 30; 20 INJECTION, SOLUTION INTRAVENOUS at 15:57

## 2022-12-31 RX ADMIN — METHYLPREDNISOLONE SODIUM SUCCINATE 40 MG: 40 INJECTION, POWDER, FOR SOLUTION INTRAMUSCULAR; INTRAVENOUS at 22:56

## 2022-12-31 RX ADMIN — METHYLPREDNISOLONE SODIUM SUCCINATE 125 MG: 125 INJECTION, POWDER, FOR SOLUTION INTRAMUSCULAR; INTRAVENOUS at 15:45

## 2022-12-31 RX ADMIN — CEFTRIAXONE 2 G: 2 INJECTION, POWDER, FOR SOLUTION INTRAMUSCULAR; INTRAVENOUS at 15:45

## 2022-12-31 RX ADMIN — MAGNESIUM SULFATE HEPTAHYDRATE 2 G: 2 INJECTION, SOLUTION INTRAVENOUS at 15:32

## 2022-12-31 RX ADMIN — ALBUTEROL SULFATE 6 PUFF: 90 AEROSOL, METERED RESPIRATORY (INHALATION) at 15:45

## 2022-12-31 RX ADMIN — SODIUM CHLORIDE, POTASSIUM CHLORIDE, SODIUM LACTATE AND CALCIUM CHLORIDE 1000 ML: 600; 310; 30; 20 INJECTION, SOLUTION INTRAVENOUS at 15:34

## 2022-12-31 RX ADMIN — AZITHROMYCIN 500 MG: 500 INJECTION, POWDER, LYOPHILIZED, FOR SOLUTION INTRAVENOUS at 15:49

## 2022-12-31 RX ADMIN — SODIUM CHLORIDE, PRESERVATIVE FREE 10 ML: 5 INJECTION INTRAVENOUS at 22:56

## 2022-12-31 RX ADMIN — IPRATROPIUM BROMIDE AND ALBUTEROL SULFATE 3 ML: 2.5; .5 SOLUTION RESPIRATORY (INHALATION) at 16:46

## 2022-12-31 RX ADMIN — IPRATROPIUM BROMIDE AND ALBUTEROL SULFATE 3 ML: 2.5; .5 SOLUTION RESPIRATORY (INHALATION) at 20:49

## 2022-12-31 RX ADMIN — ACETAMINOPHEN 1000 MG: 500 TABLET ORAL at 15:31

## 2022-12-31 NOTE — PROGRESS NOTES
TRANSFER - IN REPORT:    Verbal report received from [facility] (name) on Aravind Neville  being received from ED (unit) for routine progression of care      Report consisted of patients Situation, Background, Assessment and   Recommendations(SBAR). Information from the following report(s) SBAR, Kardex, ED Summary, Intake/Output, MAR, Recent Results, and Cardiac Rhythm NSR/ST  was reviewed with the receiving nurse. Opportunity for questions and clarification was provided. Assessment completed upon patients arrival to unit and care assumed.

## 2022-12-31 NOTE — ED NOTES
Pt resting quietly on stretcher with family at bedside. Pt remains afebrile at this time. Pt tolerating 2L nasal cannula well. Pt denies requests or complaints at this time. Up-to-date on plan of care.

## 2022-12-31 NOTE — ED NOTES
Emergency Department Nursing Plan of Care       The Nursing Plan of Care is developed from the Nursing assessment and Emergency Department Attending provider initial evaluation. The plan of care may be reviewed in the ED Provider note.     The Plan of Care was developed with the following considerations:   Patient / Family readiness to learn indicated by:verbalized understanding  Persons(s) to be included in education: patient  Barriers to Learning/Limitations:No    Signed     Jarrett Casanova RN    12/31/2022   3:53 PM

## 2022-12-31 NOTE — ED PROVIDER NOTES
[facility] EMERGENCY DEPT  EMERGENCY DEPARTMENT ENCOUNTER       Pt Name: Samra Cao  MRN: [id number]  [place] 1990  Date of evaluation: 12/31/2022  Provider: Dasia Caceres PA-C   PCP: Shailesh Lopez MD  Note Started: 3:10 PM 12/31/22     Shared Not Shared FOX: I have seen and evaluated the patient. My supervision physician was available for consultation. CHIEF COMPLAINT       Chief Complaint   Patient presents with    Cough        HISTORY OF PRESENT ILLNESS: 1 or more elements      History From: Patient  HPI Limitations : None     Samra Cao is a 28 y.o. male medical history significant for asthma and tobacco abuse who presents via self with acute moderate worsening chest tightness, wheezing, shortness of breath, fever, chills, body aches, headache, productive cough X 6 days. Albuterol, ibuprofen, Mucinex with minimal relief of symptoms. Endorses loss albuterol treatment was 2 hours prior to arrival.  He has not taken ibuprofen or Mucinex today. No known sick contacts. He is not up-to-date on COVID or flu vaccines. No lightheadedness, dizziness, syncope, seizure, numbness, tingling, abdominal pain, nausea, vomiting, diarrhea, neck pain or stiffness, sore throat, hemoptysis, unilateral leg pain or swelling. Nursing Notes were all reviewed and agreed with or any disagreements were addressed in the HPI. REVIEW OF SYSTEMS      Review of Systems   Constitutional:  Positive for chills, fatigue and fever. Negative for activity change, appetite change and diaphoresis. HENT:  Positive for congestion and rhinorrhea. Negative for dental problem, drooling, ear discharge, ear pain, facial swelling, hearing loss, mouth sores, nosebleeds, postnasal drip, sinus pressure, sinus pain, sneezing, sore throat, tinnitus, trouble swallowing and voice change. Eyes: Negative. Negative for pain and redness. Respiratory:  Positive for cough, chest tightness, shortness of breath and wheezing.  Negative for apnea, choking and stridor. Cardiovascular: Negative. Negative for chest pain, palpitations and leg swelling. Gastrointestinal: Negative. Negative for abdominal pain, constipation, diarrhea, nausea and vomiting. Genitourinary: Negative. Negative for dysuria. Musculoskeletal: Negative. Negative for myalgias, neck pain and neck stiffness. Skin: Negative. Negative for rash. Neurological:  Positive for headaches. Negative for dizziness, tremors, seizures, syncope, facial asymmetry, speech difficulty, weakness, light-headedness and numbness. Psychiatric/Behavioral: Negative. Negative for confusion. Positives and Pertinent negatives as per HPI. PAST HISTORY     Past Medical History:  Past Medical History:   Diagnosis Date    Asthma        Past Surgical History:  History reviewed. No pertinent surgical history. Family History:  Family History   Problem Relation Age of Onset    Diabetes Mother        Social History:  Social History     Tobacco Use    Smoking status: Some Days     Types: Cigarettes   Substance Use Topics    Alcohol use: Not Currently    Drug use: Not Currently     Types: Marijuana       Allergies:  No Known Allergies    CURRENT MEDICATIONS      Previous Medications    ALBUTEROL (PROVENTIL HFA, VENTOLIN HFA, PROAIR HFA) 90 MCG/ACTUATION INHALER    Take 1 Puff by inhalation every four (4) hours as needed for Wheezing. Indications: Asthma Attack    ALBUTEROL (PROVENTIL VENTOLIN) 2.5 MG /3 ML (0.083 %) NEBU    3 mL by Nebulization route every four (4) hours as needed for Cough. CHOLECALCIFEROL, VITAMIN D3, (VITAMIN D3) 5,000 UNIT TAB TABLET    Take 1 Tab by mouth daily. Indications: VITAMIN D DEFICIENCY (HIGH DOSE THERAPY)    NEBULIZER & COMPRESSOR MACHINE    1 Each by Does Not Apply route every four to six (4-6) hours as needed for Cough.     PREDNISONE (STERAPRED DS) 10 MG DOSE PACK    See administration instruction per 10mg dose pack       SCREENINGS               No data recorded PHYSICAL EXAM      ED Triage Vitals [12/31/22 1452]   ED Encounter Vitals Group      /84      Pulse (Heart Rate) (!) 120      Resp Rate 20      Temp (!) 101.7 °F (38.7 °C)      Temp src       O2 Sat (%) (!) 88 %      Weight 156 lb      Height 5' 11\"        Physical Exam  Vitals and nursing note reviewed. Constitutional:       General: He is not in acute distress. Appearance: Normal appearance. He is well-developed. He is not ill-appearing, toxic-appearing or diaphoretic. Comments: Young male sitting upright in bed in no apparent distress. He is able to speak in short but complete sentences. HENT:      Head: Normocephalic and atraumatic. Jaw: There is normal jaw occlusion. Right Ear: Hearing and external ear normal.      Left Ear: Hearing and external ear normal.      Nose: Mucosal edema, congestion and rhinorrhea present. Rhinorrhea is clear. Mouth/Throat:      Mouth: Mucous membranes are moist. No angioedema. Tongue: No lesions. Tongue does not deviate from midline. Palate: No mass and lesions. Pharynx: Oropharynx is clear. Uvula midline. No oropharyngeal exudate or posterior oropharyngeal erythema. Tonsils: No tonsillar exudate or tonsillar abscesses. Eyes:      General: No scleral icterus. Extraocular Movements: Extraocular movements intact. Conjunctiva/sclera: Conjunctivae normal.      Pupils: Pupils are equal, round, and reactive to light. Neck:      Trachea: Trachea and phonation normal.   Cardiovascular:      Rate and Rhythm: Tachycardia present. Pulses: Normal pulses. Radial pulses are 2+ on the right side and 2+ on the left side. Heart sounds: Normal heart sounds. No murmur heard. No friction rub. No gallop. Pulmonary:      Effort: Tachypnea present. No accessory muscle usage or respiratory distress. Breath sounds: No stridor. Wheezing (Bilateral inspiratory and expiratory wheeze) present.  No decreased breath sounds, rhonchi or rales. Abdominal:      General: Abdomen is flat. There is no distension. Palpations: Abdomen is soft. Tenderness: There is no abdominal tenderness. There is no guarding. Musculoskeletal:         General: Normal range of motion. Cervical back: Full passive range of motion without pain and normal range of motion. No pain with movement. Right lower leg: No edema. Left lower leg: No edema. Skin:     General: Skin is warm and dry. Capillary Refill: Capillary refill takes less than 2 seconds. Coloration: Skin is not ashen, cyanotic or pale. Findings: No rash. Neurological:      General: No focal deficit present. Mental Status: He is alert and oriented to person, place, and time. GCS: GCS eye subscore is 4. GCS verbal subscore is 5. GCS motor subscore is 6. Motor: No weakness. Psychiatric:         Mood and Affect: Mood normal.         Speech: Speech normal.         Behavior: Behavior normal.         Thought Content: Thought content normal.         Judgment: Judgment normal.        DIAGNOSTIC RESULTS   LABS:     Recent Results (from the past 12 hour(s))   CBC WITH AUTOMATED DIFF    Collection Time: 12/31/22  3:17 PM   Result Value Ref Range    WBC 11.9 (H) 4.1 - 11.1 K/uL    RBC 4.28 4.10 - 5.70 M/uL    HGB 11.7 (L) 12.1 - 17.0 g/dL    HCT 34.8 (L) 36.6 - 50.3 %    MCV 81.3 80.0 - 99.0 FL    MCH 27.3 26.0 - 34.0 PG    MCHC 33.6 30.0 - 36.5 g/dL    RDW 14.9 (H) 11.5 - 14.5 %    PLATELET 963 (H) 574 - 400 K/uL    MPV 9.1 8.9 - 12.9 FL    NRBC 0.0 0  WBC    ABSOLUTE NRBC 0.00 0.00 - 0.01 K/uL    NEUTROPHILS 73 32 - 75 %    LYMPHOCYTES 16 12 - 49 %    MONOCYTES 9 5 - 13 %    EOSINOPHILS 1 0 - 7 %    BASOPHILS 0 0 - 1 %    IMMATURE GRANULOCYTES 1 (H) 0.0 - 0.5 %    ABS. NEUTROPHILS 8.7 (H) 1.8 - 8.0 K/UL    ABS. LYMPHOCYTES 1.8 0.8 - 3.5 K/UL    ABS. MONOCYTES 1.1 (H) 0.0 - 1.0 K/UL    ABS. EOSINOPHILS 0.1 0.0 - 0.4 K/UL    ABS. BASOPHILS 0.0 0.0 - 0.1 K/UL    ABS. IMM. GRANS. 0.1 (H) 0.00 - 0.04 K/UL    DF AUTOMATED     METABOLIC PANEL, COMPREHENSIVE    Collection Time: 12/31/22  3:17 PM   Result Value Ref Range    Sodium 134 (L) 136 - 145 mmol/L    Potassium 3.6 3.5 - 5.1 mmol/L    Chloride 96 (L) 97 - 108 mmol/L    CO2 29 21 - 32 mmol/L    Anion gap 9 5 - 15 mmol/L    Glucose 91 65 - 100 mg/dL    BUN 12 6 - 20 MG/DL    Creatinine 0.84 0.70 - 1.30 MG/DL    BUN/Creatinine ratio 14 12 - 20      eGFR >60 >60 ml/min/1.73m2    Calcium 8.7 8.5 - 10.1 MG/DL    Bilirubin, total 0.4 0.2 - 1.0 MG/DL    ALT (SGPT) 48 12 - 78 U/L    AST (SGOT) 31 15 - 37 U/L    Alk.  phosphatase 126 (H) 45 - 117 U/L    Protein, total 8.3 (H) 6.4 - 8.2 g/dL    Albumin 3.0 (L) 3.5 - 5.0 g/dL    Globulin 5.3 (H) 2.0 - 4.0 g/dL    A-G Ratio 0.6 (L) 1.1 - 2.2     TROPONIN-HIGH SENSITIVITY    Collection Time: 12/31/22  3:17 PM   Result Value Ref Range    Troponin-High Sensitivity 8 0 - 76 ng/L   LACTIC ACID    Collection Time: 12/31/22  3:17 PM   Result Value Ref Range    Lactic acid 0.6 0.4 - 2.0 MMOL/L   COVID-19 WITH INFLUENZA A/B    Collection Time: 12/31/22  3:17 PM   Result Value Ref Range    SARS-CoV-2 by PCR Not detected NOTD      Influenza A by PCR Not detected      Influenza B by PCR Not detected     PROTHROMBIN TIME + INR    Collection Time: 12/31/22  3:17 PM   Result Value Ref Range    INR 1.1 0.9 - 1.1      Prothrombin time 11.1 9.0 - 11.1 sec   MAGNESIUM    Collection Time: 12/31/22  3:17 PM   Result Value Ref Range    Magnesium 1.7 1.6 - 2.4 mg/dL   GLUCOSE, POC    Collection Time: 12/31/22  3:25 PM   Result Value Ref Range    Glucose (POC) 95 65 - 117 mg/dL    Performed by Mao Terrazas RN    EKG, 12 LEAD, INITIAL    Collection Time: 12/31/22  3:25 PM   Result Value Ref Range    Ventricular Rate 96 BPM    Atrial Rate 96 BPM    P-R Interval 132 ms    QRS Duration 74 ms    Q-T Interval 382 ms    QTC Calculation (Bezet) 482 ms    Calculated P Axis 73 degrees Calculated R Axis 71 degrees    Calculated T Axis 70 degrees    Diagnosis       Normal sinus rhythm  Prolonged QT  No previous ECGs available     URINALYSIS W/ REFLEX CULTURE    Collection Time: 12/31/22  4:49 PM    Specimen: Urine   Result Value Ref Range    Color DARK YELLOW      Appearance CLEAR CLEAR      Specific gravity 1.030      pH (UA) 6.0 5.0 - 8.0      Protein 100 (A) NEG mg/dL    Glucose Negative NEG mg/dL    Ketone TRACE (A) NEG mg/dL    Blood Negative NEG      Urobilinogen 1.0 0.2 - 1.0 EU/dL    Nitrites Negative NEG      Leukocyte Esterase TRACE (A) NEG      Bilirubin UA, confirm Negative NEG      WBC 0-4 0 - 4 /hpf    RBC 0-5 0 - 5 /hpf    Epithelial cells FEW FEW /lpf    Bacteria Negative NEG /hpf    UA:UC IF INDICATED CULTURE NOT INDICATED BY UA RESULT CNI     DRUG SCREEN, URINE    Collection Time: 12/31/22  5:35 PM   Result Value Ref Range    AMPHETAMINES Negative NEG      BARBITURATES Negative NEG      BENZODIAZEPINES Negative NEG      COCAINE Positive (A) NEG      METHADONE Negative NEG      OPIATES Positive (A) NEG      PCP(PHENCYCLIDINE) Negative NEG      THC (TH-CANNABINOL) Positive (A) NEG      Drug screen comment (NOTE)         EKG: When ordered, EKG's are interpreted by the Emergency Department Physician in the absence of a cardiologist.  Please see their note for interpretation of EKG. RADIOLOGY:  Non-plain film images such as CT, Ultrasound and MRI are read by the radiologist. Plain radiographic images are visualized and preliminarily interpreted by the ED Provider with the below findings:     Interpretation per the Radiologist below, if available at the time of this note:     XR CHEST PORT    Result Date: 12/31/2022  EXAM:  XR CHEST PORT INDICATION: Infiltrate COMPARISON: December 2019 TECHNIQUE: portable chest AP view FINDINGS: The cardiac silhouette is within normal limits. The pulmonary vasculature is within normal limits.  There is diffuse prominence of the pulmonary interstitium without focal airspace disease. The visualized bones and upper abdomen are age-appropriate. Diffuse prominence of the pulmonary interstitium without focal airspace disease. May represent atypical/viral pneumonia. PROCEDURES   Unless otherwise noted below, none  Procedures     CRITICAL CARE TIME   CRITICAL CARE NOTE :    5:16 PM    IMPENDING DETERIORATION -Airway, Respiratory, Cardiovascular, CNS, Metabolic, and Renal  ASSOCIATED RISK FACTORS - Hypotension, Shock, Hypoxia, Dysrhythmia, Metabolic changes, Dehydration, Vascular Compromise, and CNS Decompensation  MANAGEMENT- Bedside Assessment and Supervision of Care  INTERPRETATION -  Xrays, ECG, and Blood Pressure  INTERVENTIONS - hemodynamic mngmt and oxygen  CASE REVIEW - Hospitalist/Intensivist, Nursing, and Family  TREATMENT RESPONSE -Stable/ Improved  PERFORMED BY - MARLINE Hart    NOTES   :  I have spent 59 minutes of critical care time involved in lab review, consultations with specialist, family decision- making, bedside attention and documentation. This time excludes time spent in any separate billed procedures. During this entire length of time I was immediately available to the patient .     Silvia Hand PA-C      EMERGENCY DEPARTMENT COURSE and DIFFERENTIAL DIAGNOSIS/MDM   Vitals:    Vitals:    12/31/22 1645 12/31/22 1708 12/31/22 1729 12/31/22 1745   BP: 125/83   (!) 137/93   Pulse: (!) 106 (!) 104  99   Resp: 14 12  12   Temp:   98.2 °F (36.8 °C)    SpO2: 95% 92%  94%   Weight:       Height:          Pulse Oximetry Analysis - Abnormal 90% on RA    Cardiac Monitor:   Rate: 102   Rhythm: Sinus Tachycardia      Patient was given the following medications:  Medications   sodium chloride (NS) flush 5-10 mL (has no administration in time range)   lactated ringers bolus infusion 1,000 mL (0 mL IntraVENous IV Completed 12/31/22 1555)     Followed by   lactated ringers bolus infusion 1,000 mL (0 mL IntraVENous IV Completed 12/31/22 1651)     Followed by   lactated ringers bolus infusion 259 mL (0 mL IntraVENous Held 12/31/22 1606)   cefTRIAXone (ROCEPHIN) 2 g in sterile water (preservative free) 20 mL IV syringe (2 g IntraVENous Given 12/31/22 1545)   azithromycin (ZITHROMAX) 500 mg in 0.9% sodium chloride 250 mL (VIAL-MATE) (0 mg IntraVENous IV Completed 12/31/22 1707)   sodium chloride (NS) flush 5-40 mL (has no administration in time range)   sodium chloride (NS) flush 5-40 mL (has no administration in time range)   acetaminophen (TYLENOL) tablet 650 mg (has no administration in time range)     Or   acetaminophen (TYLENOL) suppository 650 mg (has no administration in time range)   polyethylene glycol (MIRALAX) packet 17 g (has no administration in time range)   ondansetron (ZOFRAN ODT) tablet 4 mg (has no administration in time range)     Or   ondansetron (ZOFRAN) injection 4 mg (has no administration in time range)   enoxaparin (LOVENOX) injection 40 mg (has no administration in time range)   azithromycin (ZITHROMAX) 500 mg in 0.9% sodium chloride 250 mL (VIAL-MATE) (has no administration in time range)   albuterol-ipratropium (DUO-NEB) 2.5 MG-0.5 MG/3 ML (has no administration in time range)   methylPREDNISolone (PF) (SOLU-MEDROL) injection 40 mg (0 mg IntraVENous Held 12/31/22 1718)   cefTRIAXone (ROCEPHIN) 2 g in sterile water (preservative free) 20 mL IV syringe (has no administration in time range)   lactated Ringers infusion (75 mL/hr IntraVENous New Bag 12/31/22 1800)   albuterol (PROVENTIL HFA, VENTOLIN HFA, PROAIR HFA) inhaler 6 Puff (6 Puffs Inhalation Given 12/31/22 1545)   methylPREDNISolone (PF) (Solu-MEDROL) injection 125 mg (125 mg IntraVENous Given 12/31/22 1545)   magnesium sulfate 2 g/50 ml IVPB (premix or compounded) (0 g IntraVENous IV Completed 12/31/22 1651)   acetaminophen (TYLENOL) tablet 1,000 mg (1,000 mg Oral Given 12/31/22 6837)   albuterol-ipratropium (DUO-NEB) 2.5 MG-0.5 MG/3 ML (3 mL Nebulization Given 12/31/22 1646)       CONSULTS: (Who and What was discussed)  None    Chronic Conditions: asthma and tobacco abuse    Social Determinants affecting Dx or Tx: None    Records Reviewed (source and summary): Nursing Notes, Old Medical Records, Previous Radiology Studies, and Previous Laboratory Studies    CC/HPI Summary, DDx, ED Course, and Reassessment:     Febrile, tachycardic 26-year-old male with history significant for asthma and tobacco abuse presents with fever, tachycardia and concerns for infection. Symptoms started 6 days prior to arrival.  Most likely PNA w/ asthma exacerbation vs viral syndrome (covid/flu)  DDx: sepsis 2/2 UTI, PNA, intraabdominal infection (colitis, appendicitis, cholecystitis),  infectious diarrhea, meningitis, soft tissue infection, septic arthritis, flu/viral prodrome. Will follow sepsis protocol and order set by obtaining fluids, antibiotics, labs, lactate, EKG and frequently reassessing hemodynamic status on the patient. [id number] [facility] ED SEPSIS NOTE:     3:11 PM The patient now meets criteria for: sepsis    Fluid resuscitation with: 30 mL/kg crystalloid bolus  Due to concern for rapidly advancing infection and deterioration of patient's condition, antibiotics are started STAT and cultures ordered. ED Course as of 12/31/22 1823   Sat Dec 31, 2022   1627 Patient is resting comfortably in room in no apparent distress. He endorses that he is feeling much better. Wheezing has diminished in bilateral lung fields. Patient is still on oxygen via nasal cannula. Plan to ambulate the patient to monitor oxygen saturations to determine whether patient is appropriate for discharge or will he require admission. COVID and flu testing still pending. [SM]   4663 Patient became significantly tachycardic and hypoxic at 88% on room air when ambulated off oxygen. Plan to admit patient for hypoxia, asthma exacerbation, suspected community-acquired pneumonia versus viral pneumonia.  [SM]   [phone] RN informed me that patient states he is feeling much better and was requesting to be discharged. I went to speak to the patient and advised him that given his hypoxic state and significant tachycardia to compensate, I do not recommend discharge at this time and I recommend patient to stay for admission. I advised the patient risk of further respiratory failure including worsening hypoxia, stroke, heart attack, comatose state and risk for needing intubation. Patient understands risks involved. He is currently on the phone with his mother. My continued recommendation is admission at this time. [SM]      ED Course User Index  [SM] Juan F Dowd PA-C   TOBACCO COUNSELING:  Upon evaluation, pt expressed that they are a current tobacco user. Pt has been counseled on the dangers of smoking and was encouraged to quit as soon as possible in order to decrease further risks to their health. Pt has conveyed their understanding of the risks involved should they continue to use tobacco products. 4-minute discussion. Disposition Considerations (Tests not done, Shared Decision Making, Pt Expectation of Test or Tx.): Given patient's continued hypoxia, he is in agreement for admission for further treatment and evaluation. FINAL IMPRESSION     1. Sepsis with acute hypoxic respiratory failure without septic shock, due to unspecified organism ([address]    2. Mild intermittent asthma with acute exacerbation    3. Pneumonia of both lungs due to infectious organism, unspecified part of lung    4. Tobacco abuse          DISPOSITION/PLAN   Admitted    5:15 PM  Patient is being admitted to the hospital by Dr. Willa Handley. The results of their tests and reasons for their admission have been discussed with them and/or available family. They convey agreement and understanding for the need to be admitted and for their admission diagnosis. Consultation has been made with the inpatient physician specialist for hospitalization. I am the Primary Clinician of Record. Thania Hodges PA-C (electronically signed)    (Please note that parts of this dictation were completed with voice recognition software. Quite often unanticipated grammatical, syntax, homophones, and other interpretive errors are inadvertently transcribed by the computer software. Please disregards these errors.  Please excuse any errors that have escaped final proofreading.)

## 2022-12-31 NOTE — ED NOTES
Pt presents to ED reporting cough, shortness of breath, chest pain, and asthma flare-up x Monday. Pt reports he noticed his asthma was worse this week and woke up one night with difficulty breathing and sweats thus leading him to call EMS. Pt states when EMS got there, his vitals were stable and his symptoms had improved so he was not transported. Pt reports using at home inhalers and neb treatments without relief. Pt noted to have labored respirations, dyspnea at rest, and scattered wheezing. Pt mouth breathing during assessment. Tachycardic, hypoxic, and febrile during triage. Pt roomed and placed on cardiac monitor and sepsis protocol initiated. O2 saturations 88-90%, pt placed on 2L nasal cannula with improvement to 92%; pt then subsequently placed on 4L with improvement to high 90's. Pt appears drowsy but is able to answer all questions appropriately. Continuous pulse ox in place and pt medicated per MAR.

## 2022-12-31 NOTE — ED NOTES
Pt resting quietly on stretcher, tolerating treatments well. Pt remains on cardiac monitor with 4L supplemental O2 in place via nasal cannula. Pt denies requests or complaints at this time.

## 2022-12-31 NOTE — ED NOTES
Ambulation trial without supplemental O2 completed with patient per PA's order. Pt reports significant improvement of symptoms and states he does not feel short of breath when walking. Pt more well-appearing then when he initially came in; however, O2 saturations were 88-90% without supplemental O2 and pt tachycardic in 110-120's. PA made aware and patient up-to-date on plan of care.

## 2022-12-31 NOTE — ED NOTES
TRANSFER - OUT REPORT:    Verbal report given to Hipolito Argueta RN (name) on Jayne Baker  being transferred to [address] [address] ([facility]) for routine progression of care       Report consisted of patients Situation, Background, Assessment and   Recommendations(SBAR). Information from the following report(s) SBAR, Kardex, ED Summary, Intake/Output, MAR, Recent Results and Cardiac Rhythm Sinus Tachy with Prolonged QT was reviewed with the receiving nurse. Lines:   Peripheral IV 12/31/22 Right Antecubital (Active)   Site Assessment Clean, dry, & intact 12/31/22 1530   Phlebitis Assessment 0 12/31/22 1530   Infiltration Assessment 0 12/31/22 1530   Dressing Status Clean, dry, & intact 12/31/22 1530   Dressing Type Transparent 12/31/22 1530   Hub Color/Line Status Pink 12/31/22 1530   Action Taken Blood drawn 12/31/22 1530   Alcohol Cap Used No 12/31/22 1530       Peripheral IV 12/31/22 Left Antecubital (Active)   Site Assessment Clean, dry, & intact 12/31/22 1530   Phlebitis Assessment 0 12/31/22 1530   Infiltration Assessment 0 12/31/22 1530   Dressing Status Clean, dry, & intact 12/31/22 1530   Dressing Type Transparent 12/31/22 1530   Hub Color/Line Status Pink 12/31/22 1530   Action Taken Blood drawn 12/31/22 1530   Alcohol Cap Used No 12/31/22 1530        Opportunity for questions and clarification was provided.       Patient transported with:   Monitor  O2 @ 2 liters  Registered Nurse

## 2022-12-31 NOTE — H&P
[id number] [facility] Adult  Hospitalist Group  History and Physical    Date of Service:  12/31/2022  Primary Care Provider: Johnathan Castillo MD  Source of information: The patient    Chief Complaint: Cough      History of Presenting Illness:   Thomas Greenberg is a 28 y.o. male with known past medical history of asthma, usually controlled with Symbicort as needed at home who presents to ED with shortness of breath for last 6 days, gradual increase in severity, associated with productive cough with clear phlegm. 6 days ago on Monday patient called ambulance, was evaluated at home, did not require treatment in the emergency department per paramedics. In the emergency department patient was evaluated, he is febrile, temperature one 1.7, he is hypoxic with oxygen saturation 88% on room air, requiring supplemental oxygen 4 L/min via nasal cannula. Laboratory data was obtained, CBC elevated at 11.9. Chest x-ray was significant for diffuse prominence of pulmonary interstitial, possible atypical/viral pneumonia. The patient had sick contact at work. Antibiotic IV with ceftriaxone and azithromycin, steroids IV were initiated, nebulizer DuoNeb and supplemental oxygen were provided. Blood cultures were obtained. Medicine was consulted for admission and further evaluation. The patient denies any headache, blurry vision, sore throat, trouble swallowing, trouble with speech, chest pain, N/V/D, abd pain, urinary symptoms, constipation, recent travels, sick contacts, focal or generalized neurological symptoms, falls, injuries, rashes, contact with COVID-19 diagnosed patients, hematemesis, melena, hemoptysis, hematuria, rashes, denies starting any new medications and denies any other concerns or problems besides as mentioned above. REVIEW OF SYSTEMS:  A comprehensive review of systems was negative except for that written in the History of Present Illness.      Past Medical History:   Diagnosis Date    Asthma History reviewed. No pertinent surgical history. Prior to Admission medications    Medication Sig Start Date End Date Taking? Authorizing Provider   albuterol (PROVENTIL HFA, VENTOLIN HFA, PROAIR HFA) 90 mcg/actuation inhaler Take 1 Puff by inhalation every four (4) hours as needed for Wheezing. Indications: Asthma Attack 12/3/19   Maciel Carcamo PA-C   albuterol (PROVENTIL VENTOLIN) 2.5 mg /3 mL (0.083 %) nebu 3 mL by Nebulization route every four (4) hours as needed for Cough. 12/3/19   Maciel Carcamo PA-C   Nebulizer & Compressor machine 1 Each by Does Not Apply route every four to six (4-6) hours as needed for Cough. 12/3/19   Maciel Carcamo PA-C   predniSONE (STERAPRED DS) 10 mg dose pack See administration instruction per 10mg dose pack 12/4/19   Benny OH PA-C   cholecalciferol, VITAMIN D3, (VITAMIN D3) 5,000 unit tab tablet Take 1 Tab by mouth daily. Indications: VITAMIN D DEFICIENCY (HIGH DOSE THERAPY) 12/7/16   Yonny Fabian MD     No Known Allergies   Family History   Problem Relation Age of Onset    Diabetes Mother     Asthma: Father  Cancer:  Father  Social History:  reports that he has been smoking cigarettes. He does not have any smokeless tobacco history on file. He reports that he does not currently use alcohol. He reports that he does not currently use drugs after having used the following drugs: Marijuana.    Social Determinants of Health     Tobacco Use: High Risk    Smoking Tobacco Use: Some Days    Smokeless Tobacco Use: Unknown    Passive Exposure: Not on file   Alcohol Use: Not on file   Financial Resource Strain: Not on file   Food Insecurity: Not on file   Transportation Needs: Not on file   Physical Activity: Not on file   Stress: Not on file   Social Connections: Not on file   Intimate Partner Violence: Not on file   Depression: Not on file   Housing Stability: Not on file        Family and social history were personally reviewed, all pertinent and relevant details are outlined as above. Objective:   Visit Vitals  /83   Pulse (!) 104   Temp 100.4 °F (38 °C)   Resp 12   Ht 5' 11\" (1.803 m)   Wt 70.8 kg (156 lb)   SpO2 92%   BMI 21.76 kg/m²    O2 Flow Rate (L/min): 4 l/min O2 Device: Nasal cannula    PHYSICAL EXAM:   General: Alert x oriented x 3, awake, + acute distress, + SOB, pleasant male, appears to be stated age  HEENT: PEERL, EOMI, moist mucus membranes  Neck: Supple, no JVD, no meningeal signs  Chest: Coarse to auscultation bilaterally, wheezes b/l  CVS: RRR, tachy, S1 S2 heard, no murmurs/rubs/gallops  Abd: Soft, non-tender, non-distended, +bowel sounds   Ext: No clubbing, no cyanosis, no edema  Neuro/Psych: Pleasant mood and affect, CN 2-12 grossly intact, sensory grossly within normal limit, Strength 5/5 in all extremities, DTR 1+ x 4  Cap refill: Brisk, less than 3 seconds  Pulses: 2+, symmetric in all extremities  Skin: Warm, dry, without rashes or lesions    Data Review: All diagnostic labs and studies have been reviewed. Abnormal Labs Reviewed   CBC WITH AUTOMATED DIFF - Abnormal; Notable for the following components:       Result Value    WBC 11.9 (*)     HGB 11.7 (*)     HCT 34.8 (*)     RDW 14.9 (*)     PLATELET 554 (*)     IMMATURE GRANULOCYTES 1 (*)     ABS. NEUTROPHILS 8.7 (*)     ABS. MONOCYTES 1.1 (*)     ABS. IMM. GRANS. 0.1 (*)     All other components within normal limits   METABOLIC PANEL, COMPREHENSIVE - Abnormal; Notable for the following components:    Sodium 134 (*)     Chloride 96 (*)     Alk.  phosphatase 126 (*)     Protein, total 8.3 (*)     Albumin 3.0 (*)     Globulin 5.3 (*)     A-G Ratio 0.6 (*)     All other components within normal limits   URINALYSIS W/ REFLEX CULTURE - Abnormal; Notable for the following components:    Protein 100 (*)     Ketone TRACE (*)     Leukocyte Esterase TRACE (*)     All other components within normal limits       All Micro Results       Procedure Component Value Units Date/Time    RESPIRATORY VIRUS PANEL W/COVID-19, PCR [[id number]]     Order Status: Sent Specimen: NASOPHARYNGEAL SWAB     CULTURE, RESPIRATORY/SPUTUM/BRONCH [place] Must [[id number]]     Order Status: Sent Specimen: Sputum     COVID-19 WITH INFLUENZA A/B [[id number]] Collected: 12/31/22 1517    Order Status: Completed Specimen: Nasopharyngeal Updated: 12/31/22 1628     SARS-CoV-2 by PCR Not detected        Comment: Not Detected results do not preclude SARS-CoV-2 infection and should not be used as the sole basis for patient management decisions. Results must be combined with clinical observations, patient history, and epidemiological information. Influenza A by PCR Not detected        Influenza B by PCR Not detected        Comment: Testing was performed using vivi Cynthia SARS-CoV-2 and Influenza A/B nucleic acid assay. This test is a multiplex Real-Time Reverse Transcriptase Polymerase Chain Reaction (RT-PCR) based in vitro diagnostic test intended for the qualitative detection of nucleic acids from SARS-CoV-2, Influenza A, and Influenza B in nasopharyngeal and nasal swab specimens for use under the FDA's Emergency Use Authorization (EUA) only. Fact sheet for Patients: FindDrives.pl  Fact sheet for Healthcare Providers: FindDrives.pl         CULTURE, BLOOD [[id number]] Collected: 12/31/22 1517    Order Status: Sent Specimen: Blood Updated: 12/31/22 1535    CULTURE, BLOOD [[id number]] Collected: 12/31/22 1517    Order Status: Sent Specimen: Blood Updated: 12/31/22 1534            IMAGING:   XR CHEST PORT   Final Result      Diffuse prominence of the pulmonary interstitium without focal airspace disease. May represent atypical/viral pneumonia.               ECG/ECHO:    Results for orders placed or performed during the hospital encounter of 12/31/22   EKG, 12 LEAD, INITIAL   Result Value Ref Range    Ventricular Rate 96 BPM    Atrial Rate 96 BPM    P-R Interval 132 ms    QRS Duration 74 ms    Q-T Interval 382 ms    QTC Calculation (Bezet) 482 ms    Calculated P Axis 73 degrees    Calculated R Axis 71 degrees    Calculated T Axis 70 degrees    Diagnosis       Normal sinus rhythm  Prolonged QT  No previous ECGs available          Assessment:   Given the patient's current clinical presentation, there is a high level of concern for decompensation if discharged from the emergency department. Complex decision making was performed, which includes reviewing the patient's available past medical records, laboratory results, and imaging studies. Active Problems:    Pneumonia (12/31/2022)    Acute respiratory failure with hypoxia    Pneumonia    Sepsis, due to pneumonia. Fever    Asthma exacerbation    Marijuana use    Plan:     The patient will be admitted to medicine, telemetry unit is inpatient and will require at least 2 midnight for inpatient treatment. IV fluids for hydration will be provided. Antibiotic with ceftriaxone and azithromycin were initiated will be continued. Blood cultures were obtained still pending. Sputum culture was requested. Respiratory PCR, viral panel requested. COVID-19 and influenza are negative. Nebulizer will be provided, DuoNeb scheduled and albuterol as needed. Steroids IV were initiated and will be continued. Advised marijuana cessation        DIET: ADULT DIET Regular   ISOLATION PRECAUTIONS: There are currently no Active Isolations  CODE STATUS: Full Code   DVT PROPHYLAXIS: Lovenox  FUNCTIONAL STATUS PRIOR TO HOSPITALIZATION: Fully active and ambulatory; able to carry on all self-care without restriction. Ambulatory status/function: By self   EARLY MOBILITY ASSESSMENT: Recommend routine ambulation while hospitalized with the assistance of nursing staff  ANTICIPATED DISCHARGE: 24-48 hours.   ANTICIPATED DISPOSITION: Home  EMERGENCY CONTACT/SURROGATE DECISION MAKER: Abraham Lu (Mother)     CRITICAL CARE WAS PERFORMED FOR THIS ENCOUNTER: NO.      Signed By: Ashleigh Wills MD     December 31, 2022         Please note that this dictation may have been completed with [employer], the computer voice recognition software. Quite often unanticipated grammatical, syntax, homophones, and other interpretive errors are inadvertently transcribed by the computer software. Please disregard these errors. Please excuse any errors that have escaped final proofreading.

## 2022-12-31 NOTE — ED NOTES
Pt given sandwich, crackers, and ginger ale per request. Pt explained process of admission and reluctant to staying. Pt educated on reason for admission as well as risks of leaving. PA aware and at bedside to speak with patient.

## 2023-01-01 VITALS
OXYGEN SATURATION: 100 % | BODY MASS INDEX: 22.87 KG/M2 | WEIGHT: 163.36 LBS | TEMPERATURE: 98.5 F | HEIGHT: 71 IN | RESPIRATION RATE: 20 BRPM | HEART RATE: 81 BPM | SYSTOLIC BLOOD PRESSURE: 120 MMHG | DIASTOLIC BLOOD PRESSURE: 66 MMHG

## 2023-01-01 LAB
ALBUMIN SERPL-MCNC: 2.4 G/DL (ref 3.5–5)
ALBUMIN/GLOB SERPL: 0.5 (ref 1.1–2.2)
ALP SERPL-CCNC: 120 U/L (ref 45–117)
ALT SERPL-CCNC: 48 U/L (ref 12–78)
ANION GAP SERPL CALC-SCNC: 6 MMOL/L (ref 5–15)
AST SERPL-CCNC: 36 U/L (ref 15–37)
ATRIAL RATE: 96 BPM
B PERT DNA SPEC QL NAA+PROBE: NOT DETECTED
BASOPHILS # BLD: 0 K/UL (ref 0–0.1)
BASOPHILS NFR BLD: 0 % (ref 0–1)
BILIRUB SERPL-MCNC: 0.2 MG/DL (ref 0.2–1)
BORDETELLA PARAPERTUSSIS PCR, BORPAR: NOT DETECTED
BUN SERPL-MCNC: 11 MG/DL (ref 6–20)
BUN/CREAT SERPL: 16 (ref 12–20)
C PNEUM DNA SPEC QL NAA+PROBE: NOT DETECTED
CALCIUM SERPL-MCNC: 8.5 MG/DL (ref 8.5–10.1)
CALCULATED P AXIS, ECG09: 73 DEGREES
CALCULATED R AXIS, ECG10: 71 DEGREES
CALCULATED T AXIS, ECG11: 70 DEGREES
CHLORIDE SERPL-SCNC: 101 MMOL/L (ref 97–108)
CO2 SERPL-SCNC: 28 MMOL/L (ref 21–32)
CREAT SERPL-MCNC: 0.69 MG/DL (ref 0.7–1.3)
DIAGNOSIS, 93000: NORMAL
DIFFERENTIAL METHOD BLD: ABNORMAL
EOSINOPHIL # BLD: 0 K/UL (ref 0–0.4)
EOSINOPHIL NFR BLD: 0 % (ref 0–7)
ERYTHROCYTE [DISTWIDTH] IN BLOOD BY AUTOMATED COUNT: 14.6 % (ref 11.5–14.5)
FLUAV H1 2009 PAND RNA SPEC QL NAA+PROBE: NOT DETECTED
FLUAV H1 RNA SPEC QL NAA+PROBE: NOT DETECTED
FLUAV H3 RNA SPEC QL NAA+PROBE: NOT DETECTED
FLUAV SUBTYP SPEC NAA+PROBE: NOT DETECTED
FLUBV RNA SPEC QL NAA+PROBE: NOT DETECTED
GLOBULIN SER CALC-MCNC: 4.9 G/DL (ref 2–4)
GLUCOSE SERPL-MCNC: 132 MG/DL (ref 65–100)
HADV DNA SPEC QL NAA+PROBE: NOT DETECTED
HCOV 229E RNA SPEC QL NAA+PROBE: NOT DETECTED
HCOV HKU1 RNA SPEC QL NAA+PROBE: NOT DETECTED
HCOV NL63 RNA SPEC QL NAA+PROBE: NOT DETECTED
HCOV OC43 RNA SPEC QL NAA+PROBE: NOT DETECTED
HCT VFR BLD AUTO: 30.5 % (ref 36.6–50.3)
HGB BLD-MCNC: 10.6 G/DL (ref 12.1–17)
HMPV RNA SPEC QL NAA+PROBE: NOT DETECTED
HPIV1 RNA SPEC QL NAA+PROBE: NOT DETECTED
HPIV2 RNA SPEC QL NAA+PROBE: NOT DETECTED
HPIV3 RNA SPEC QL NAA+PROBE: NOT DETECTED
HPIV4 RNA SPEC QL NAA+PROBE: NOT DETECTED
IMM GRANULOCYTES # BLD AUTO: 0.1 K/UL (ref 0–0.04)
IMM GRANULOCYTES NFR BLD AUTO: 1 % (ref 0–0.5)
INR PPP: 1.2 (ref 0.9–1.1)
LACTATE SERPL-SCNC: 0.9 MMOL/L (ref 0.4–2)
LYMPHOCYTES # BLD: 1.4 K/UL (ref 0.8–3.5)
LYMPHOCYTES NFR BLD: 13 % (ref 12–49)
M PNEUMO DNA SPEC QL NAA+PROBE: NOT DETECTED
MAGNESIUM SERPL-MCNC: 2 MG/DL (ref 1.6–2.4)
MCH RBC QN AUTO: 27 PG (ref 26–34)
MCHC RBC AUTO-ENTMCNC: 34.8 G/DL (ref 30–36.5)
MCV RBC AUTO: 77.8 FL (ref 80–99)
MONOCYTES # BLD: 0.4 K/UL (ref 0–1)
MONOCYTES NFR BLD: 4 % (ref 5–13)
NEUTS SEG # BLD: 8.8 K/UL (ref 1.8–8)
NEUTS SEG NFR BLD: 82 % (ref 32–75)
NRBC # BLD: 0 K/UL (ref 0–0.01)
NRBC BLD-RTO: 0 PER 100 WBC
P-R INTERVAL, ECG05: 132 MS
PLATELET # BLD AUTO: 385 K/UL (ref 150–400)
PMV BLD AUTO: 9 FL (ref 8.9–12.9)
POTASSIUM SERPL-SCNC: 4.1 MMOL/L (ref 3.5–5.1)
PROT SERPL-MCNC: 7.3 G/DL (ref 6.4–8.2)
PROTHROMBIN TIME: 11.8 SEC (ref 9–11.1)
Q-T INTERVAL, ECG07: 382 MS
QRS DURATION, ECG06: 74 MS
QTC CALCULATION (BEZET), ECG08: 482 MS
RBC # BLD AUTO: 3.92 M/UL (ref 4.1–5.7)
RSV RNA SPEC QL NAA+PROBE: NOT DETECTED
RV+EV RNA SPEC QL NAA+PROBE: NOT DETECTED
SARS-COV-2 PCR, COVPCR: NOT DETECTED
SODIUM SERPL-SCNC: 135 MMOL/L (ref 136–145)
VENTRICULAR RATE, ECG03: 96 BPM
WBC # BLD AUTO: 10.7 K/UL (ref 4.1–11.1)

## 2023-01-01 PROCEDURE — 96376 TX/PRO/DX INJ SAME DRUG ADON: CPT

## 2023-01-01 PROCEDURE — 83735 ASSAY OF MAGNESIUM: CPT

## 2023-01-01 PROCEDURE — 74011250636 HC RX REV CODE- 250/636: Performed by: INTERNAL MEDICINE

## 2023-01-01 PROCEDURE — 85610 PROTHROMBIN TIME: CPT

## 2023-01-01 PROCEDURE — 80053 COMPREHEN METABOLIC PANEL: CPT

## 2023-01-01 PROCEDURE — 77010033678 HC OXYGEN DAILY

## 2023-01-01 PROCEDURE — 85025 COMPLETE CBC W/AUTO DIFF WBC: CPT

## 2023-01-01 PROCEDURE — 74011250637 HC RX REV CODE- 250/637: Performed by: HOSPITALIST

## 2023-01-01 PROCEDURE — 83605 ASSAY OF LACTIC ACID: CPT

## 2023-01-01 PROCEDURE — 94640 AIRWAY INHALATION TREATMENT: CPT

## 2023-01-01 PROCEDURE — 77030029684 HC NEB SM VOL KT MONA -A

## 2023-01-01 PROCEDURE — 74011000250 HC RX REV CODE- 250: Performed by: INTERNAL MEDICINE

## 2023-01-01 PROCEDURE — 94761 N-INVAS EAR/PLS OXIMETRY MLT: CPT

## 2023-01-01 PROCEDURE — G0378 HOSPITAL OBSERVATION PER HR: HCPCS

## 2023-01-01 PROCEDURE — 36415 COLL VENOUS BLD VENIPUNCTURE: CPT

## 2023-01-01 RX ORDER — METHYLPREDNISOLONE 4 MG/1
TABLET ORAL
Qty: 1 DOSE PACK | Refills: 0 | Status: SHIPPED | OUTPATIENT
Start: 2023-01-01

## 2023-01-01 RX ORDER — ALBUTEROL SULFATE 90 UG/1
1 AEROSOL, METERED RESPIRATORY (INHALATION)
Qty: 1 EACH | Refills: 0 | Status: SHIPPED | OUTPATIENT
Start: 2023-01-01

## 2023-01-01 RX ORDER — CEFPODOXIME PROXETIL 200 MG/1
200 TABLET, FILM COATED ORAL 2 TIMES DAILY
Qty: 14 TABLET | Refills: 0 | Status: SHIPPED | OUTPATIENT
Start: 2023-01-01 | End: 2023-01-08

## 2023-01-01 RX ORDER — ALBUTEROL SULFATE 0.83 MG/ML
2.5 SOLUTION RESPIRATORY (INHALATION)
Qty: 1 EACH | Refills: 0 | Status: SHIPPED | OUTPATIENT
Start: 2023-01-01 | End: 2023-01-31

## 2023-01-01 RX ORDER — LANOLIN ALCOHOL/MO/W.PET/CERES
3 CREAM (GRAM) TOPICAL
Status: DISCONTINUED | OUTPATIENT
Start: 2023-01-01 | End: 2023-01-01 | Stop reason: HOSPADM

## 2023-01-01 RX ORDER — AZITHROMYCIN 500 MG/1
500 TABLET, FILM COATED ORAL DAILY
Qty: 5 TABLET | Refills: 0 | Status: SHIPPED | OUTPATIENT
Start: 2023-01-01 | End: 2023-01-06

## 2023-01-01 RX ADMIN — METHYLPREDNISOLONE SODIUM SUCCINATE 40 MG: 40 INJECTION, POWDER, FOR SOLUTION INTRAMUSCULAR; INTRAVENOUS at 05:26

## 2023-01-01 RX ADMIN — IPRATROPIUM BROMIDE AND ALBUTEROL SULFATE 3 ML: 2.5; .5 SOLUTION RESPIRATORY (INHALATION) at 07:41

## 2023-01-01 RX ADMIN — WATER 2 G: 1 INJECTION INTRAMUSCULAR; INTRAVENOUS; SUBCUTANEOUS at 00:29

## 2023-01-01 RX ADMIN — IPRATROPIUM BROMIDE AND ALBUTEROL SULFATE 3 ML: 2.5; .5 SOLUTION RESPIRATORY (INHALATION) at 02:18

## 2023-01-01 RX ADMIN — Medication 3 MG: at 00:55

## 2023-01-01 RX ADMIN — AZITHROMYCIN 500 MG: 500 INJECTION, POWDER, LYOPHILIZED, FOR SOLUTION INTRAVENOUS at 00:25

## 2023-01-01 RX ADMIN — SODIUM CHLORIDE, POTASSIUM CHLORIDE, SODIUM LACTATE AND CALCIUM CHLORIDE 75 ML/HR: 600; 310; 30; 20 INJECTION, SOLUTION INTRAVENOUS at 05:24

## 2023-01-01 RX ADMIN — SODIUM CHLORIDE, PRESERVATIVE FREE 10 ML: 5 INJECTION INTRAVENOUS at 05:24

## 2023-01-01 NOTE — PROGRESS NOTES
shift change report given to Nahomy Harvey RN (oncoming nurse) by Shaggy Mendez RN (offgoing nurse). Report included the following information SBAR, Kardex, and MAR.

## 2023-01-01 NOTE — PROGRESS NOTES
Pt arrived via stretcher, A/0x4, verbal on 2L via NC. Pt denies any pain. Pt on tele NS, has #20 G IV in [facility] and @#20 G in [address] [place] [address]. Pt skin is diaphoretic and dressing on IV site has to be reinforced. Coban applied around each IV site. Pt on LR @ 75 ml/hr. ED nurse stated that patient has previously wanted to leave but decided he will stay for the night. Pt mother at bedside. Pt oriented to call bell system. He was given a urinal as safety measure, due to him getting sob at times. Pt has on socks but did not want to put on the non skid socks. Pt was so sweaty he did not put on a gown he just kept on his shorts and a christiano shirt.  Call bell in reach

## 2023-01-01 NOTE — PROGRESS NOTES
This RN went to bring pt's breakfast tray in. Pt stated he didn't want it. As this RN leaving pt walking out behind RN. Pt stated Ivs were out when asked. This RN found 2 20g on floor, cath tip intact. Pt walked into elevator & downstairs.      Pt eloped, will notify MD

## 2023-01-01 NOTE — PROGRESS NOTES
Problem: Pain  Goal: *Control of Pain  Outcome: Progressing Towards Goal     Problem: Falls - Risk of  Goal: *Absence of Falls  Description: Document Mary Fall Risk and appropriate interventions in the flowsheet.   Outcome: Progressing Towards Goal  Note: Fall Risk Interventions:                                Problem: Pain  Goal: *Control of Pain  Outcome: Progressing Towards Goal

## 2023-01-01 NOTE — PROGRESS NOTES
[facility] Adult  Hospitalist Group                                                                                          Hospitalist Progress Note  Ze Mayorga MD  Answering service: [phone] from in house phone        Date of Service:  2023  NAME:  Bob Walden  :  1990  MRN:  [id number]      Admission Summary:   Bob Walden is a 28 y.o. male with known past medical history of asthma, usually controlled with Symbicort as needed at home who presents to ED with shortness of breath for last 6 days, gradual increase in severity, associated with productive cough with clear phlegm. 6 days ago on Monday patient called ambulance, was evaluated at home, did not require treatment in the emergency department per paramedics. In the emergency department patient was evaluated, he is febrile, temperature one 1.7, he is hypoxic with oxygen saturation 88% on room air, requiring supplemental oxygen 4 L/min via nasal cannula. Laboratory data was obtained, CBC elevated at 11.9. Chest x-ray was significant for diffuse prominence of pulmonary interstitial, possible atypical/viral pneumonia. The patient had sick contact at work. Antibiotic IV with ceftriaxone and azithromycin, steroids IV were initiated, nebulizer DuoNeb and supplemental oxygen were provided. Blood cultures were obtained. Medicine was consulted for admission and further evaluation. Interval history / Subjective:   NAD  No fever in am  BC so far NGTD 7 hrs  Off supplemental O2  Pt requested to go home, wants to leave right  away     Assessment & Plan:     Acute respiratory failure with hypoxia  Sepsis, due to pneumonia.   Pneumonia  Fever, resolved  Continue CTX and Azithromycin  nebs  BC follow up  Of supplemental O2      Asthma exacerbation  Continue neb  Steroids IV  Abx        Marijuana use  Recommended to quit        Code status: Full code  Prophylaxis: Lovenox  Care Plan discussed with: patient and RN  Anticipated Disposition: home in 1-2 days, but pt wants to leave hospital       Hospital Problems  Date Reviewed: 11/3/2016            Codes Class Noted POA    Pneumonia ICD-10-CM: J18.9  ICD-9-CM: 778  12/31/2022 Unknown             Review of Systems:   A comprehensive review of systems was negative except for that written in the HPI. Vital Signs:    Last 24hrs VS reviewed since prior progress note. Most recent are:  Visit Vitals  /66 (BP 1 Location: Right upper arm, BP Patient Position: At rest)   Pulse 81   Temp 98.5 °F (36.9 °C)   Resp 20   Ht 5' 11\" (1.803 m)   Wt 74.1 kg (163 lb 5.8 oz)   SpO2 100%   BMI 22.78 kg/m²         Intake/Output Summary (Last 24 hours) at 1/1/2023 0818  Last data filed at 1/1/2023 3061  Gross per 24 hour   Intake 4597 ml   Output 1500 ml   Net 3097 ml        Physical Examination:     I had a face to face encounter with this patient and independently examined them on 1/1/2023 as outlined below:          Constitutional:  No acute distress, cooperative, pleasant    ENT:  Oral mucosa moist, oropharynx benign. Resp:  Coarse bilaterally. + wheezing L> R. No accessory muscle use. CV:  Regular rhythm, normal rate, no murmurs, gallops, rubs    GI:  Soft, non distended, non tender. normoactive bowel sounds, no hepatosplenomegaly     Musculoskeletal:  No edema, warm, 2+ pulses throughout    Neurologic:  Moves all extremities.   AAOx3, CN II-XII reviewed            Data Review:    Review and/or order of clinical lab test      Labs:     Recent Labs     01/01/23 0411 12/31/22  1517   WBC 10.7 11.9*   HGB 10.6* 11.7*   HCT 30.5* 34.8*    428*     Recent Labs     01/01/23  0411 12/31/22  1517   * 134*   K 4.1 3.6    96*   CO2 28 29   BUN 11 12   CREA 0.69* 0.84   * 91   CA 8.5 8.7   MG 2.0 1.7     Recent Labs     01/01/23  0411 12/31/22  1517   ALT 48 48   * 126*   TBILI 0.2 0.4   TP 7.3 8.3*   ALB 2.4* 3.0*   GLOB 4.9* 5.3*     Recent Labs 01/01/23  0411 12/31/22  1517   INR 1.2* 1.1   PTP 11.8* 11.1      No results for input(s): FE, TIBC, PSAT, FERR in the last 72 hours. No results found for: FOL, RBCF   No results for input(s): PH, PCO2, PO2 in the last 72 hours. No results for input(s): CPK, CKNDX, TROIQ in the last 72 hours.     No lab exists for component: CPKMB  Lab Results   Component Value Date/Time    Cholesterol, total 189 11/03/2016 11:10 AM    HDL Cholesterol 54 11/03/2016 11:10 AM    LDL, calculated 109 (H) 11/03/2016 11:10 AM    Triglyceride 129 11/03/2016 11:10 AM     Lab Results   Component Value Date/Time    Glucose (POC) 95 12/31/2022 03:25 PM     Lab Results   Component Value Date/Time    Color DARK YELLOW 12/31/2022 04:49 PM    Appearance CLEAR 12/31/2022 04:49 PM    Specific gravity 1.030 12/31/2022 04:49 PM    pH (UA) 6.0 12/31/2022 04:49 PM    Protein 100 (A) 12/31/2022 04:49 PM    Glucose Negative 12/31/2022 04:49 PM    Ketone TRACE (A) 12/31/2022 04:49 PM    Bilirubin Negative 11/03/2016 11:10 AM    Urobilinogen 1.0 12/31/2022 04:49 PM    Nitrites Negative 12/31/2022 04:49 PM    Leukocyte Esterase TRACE (A) 12/31/2022 04:49 PM    Epithelial cells FEW 12/31/2022 04:49 PM    Bacteria Negative 12/31/2022 04:49 PM    WBC 0-4 12/31/2022 04:49 PM    RBC 0-5 12/31/2022 04:49 PM         Medications Reviewed:     Current Facility-Administered Medications   Medication Dose Route Frequency    melatonin tablet 3 mg  3 mg Oral QHS PRN    sodium chloride (NS) flush 5-10 mL  5-10 mL IntraVENous PRN    cefTRIAXone (ROCEPHIN) 2 g in sterile water (preservative free) 20 mL IV syringe  2 g IntraVENous Q24H    azithromycin (ZITHROMAX) 500 mg in 0.9% sodium chloride 250 mL (VIAL-MATE)  500 mg IntraVENous Q24H    sodium chloride (NS) flush 5-40 mL  5-40 mL IntraVENous Q8H    sodium chloride (NS) flush 5-40 mL  5-40 mL IntraVENous PRN    acetaminophen (TYLENOL) tablet 650 mg  650 mg Oral Q6H PRN    Or    acetaminophen (TYLENOL) suppository 650 mg 650 mg Rectal Q6H PRN    polyethylene glycol (MIRALAX) packet 17 g  17 g Oral DAILY PRN    ondansetron (ZOFRAN ODT) tablet 4 mg  4 mg Oral Q8H PRN    Or    ondansetron (ZOFRAN) injection 4 mg  4 mg IntraVENous Q6H PRN    enoxaparin (LOVENOX) injection 40 mg  40 mg SubCUTAneous DAILY    azithromycin (ZITHROMAX) 500 mg in 0.9% sodium chloride 250 mL (VIAL-MATE)  500 mg IntraVENous Q24H    albuterol-ipratropium (DUO-NEB) 2.5 MG-0.5 MG/3 ML  3 mL Nebulization Q6H RT    methylPREDNISolone (PF) (SOLU-MEDROL) injection 40 mg  40 mg IntraVENous Q8H    cefTRIAXone (ROCEPHIN) 2 g in sterile water (preservative free) 20 mL IV syringe  2 g IntraVENous Q24H    lactated Ringers infusion  75 mL/hr IntraVENous CONTINUOUS     ______________________________________________________________________  EXPECTED LENGTH OF STAY: - - -  ACTUAL LENGTH OF STAY:          1                 Marko Jarquin MD

## 2023-01-01 NOTE — DISCHARGE SUMMARY
Physician Discharge Summary  [place]     Patient ID:    Samra Cao  [id number]  [unfilled]  1990    Admit date: 12/31/2022    Discharge date and time: 1/1/2023    Hospital Diagnoses and Treatment Rendered:   Samra Cao is a 28 y.o. male with known past medical history of asthma, usually controlled with Symbicort as needed at home who presents to ED with shortness of breath for last 6 days, gradual increase in severity, associated with productive cough with clear phlegm. 6 days ago on Monday patient called ambulance, was evaluated at home, did not require treatment in the emergency department per paramedics. In the emergency department patient was evaluated, he is febrile, temperature one 1.7, he is hypoxic with oxygen saturation 88% on room air, requiring supplemental oxygen 4 L/min via nasal cannula. Laboratory data was obtained, CBC elevated at 11.9. Chest x-ray was significant for diffuse prominence of pulmonary interstitial, possible atypical/viral pneumonia. The patient had sick contact at work. Antibiotic IV with ceftriaxone and azithromycin, steroids IV were initiated, nebulizer DuoNeb and supplemental oxygen were provided. Blood cultures were obtained. Medicine was consulted for admission and further evaluation. The patient was admitted to medicine, antibiotic with ceftriaxone and azithromycin were initiated, nebulizer with DuoNeb were provided, steroids IV Solu-Medrol was given, and supplemental oxygen via nasal cannula was provided. Blood culture preliminary report was negative for growth, respiratory PCR was negative, COVID and influenza test were negative. On current medical management condition of patient improve, shortness of breath was controlled, fever resolved, hypoxia resolved, the patient still had wheezes bilateral on auscultation.   And he requested to leave hospital.  In length discussed with patient necessity to remain in the hospital and continue treatment for additional day. Explained the patient risk of leaving hospital [address] and possible worsening of patient condition, and risk of death. The patient pulled out IV and eloped from the hospital.  Attempted to reach patient and his family over phone listed in the epic, left message regarding continue antibiotic as outpatient. Acute respiratory failure with hypoxia  Sepsis, due to pneumonia. Pneumonia  Fever, resolved  Continue CTX and Azithromycin  nebs  BC follow up  Of supplemental O2       Asthma exacerbation  Continue neb  Steroids IV  Abx         Marijuana use  Recommended to quit      Chronic Diagnoses:    Problem List as of 1/1/2023 Date Reviewed: 11/3/2016            Codes Class Noted - Resolved    Pneumonia ICD-10-CM: J18.9  ICD-9-CM: 486  12/31/2022 - Present        Leukocytosis, unspecified ICD-10-CM: D72.829  ICD-9-CM: 288.60  9/9/2010 - Present        Hyperglycemia ICD-10-CM: R73.9  ICD-9-CM: 790.29  9/9/2010 - Present        Acute respiratory failure ([facility] [address] ICD-10-CM: J96.00  ICD-9-CM: 518.81  9/8/2010 - Present        Unspecified asthma, with exacerbation ICD-10-CM: J45.901  ICD-9-CM: 493.92  9/8/2010 - Present           Discharge Medications:   Discharge Medication List as of 1/1/2023  8:57 AM        CONTINUE these medications which have NOT CHANGED    Details   Nebulizer & Compressor machine 1 Each by Does Not Apply route every four to six (4-6) hours as needed for Cough. , Print, Disp-1 Each, R-0      albuterol (PROVENTIL HFA, VENTOLIN HFA, PROAIR HFA) 90 mcg/actuation inhaler Take 1 Puff by inhalation every four (4) hours as needed for Wheezing. Indications: Asthma Attack, Print, Disp-1 Inhaler, R-0      albuterol (PROVENTIL VENTOLIN) 2.5 mg /3 mL (0.083 %) nebu 3 mL by Nebulization route every four (4) hours as needed for Cough. , Print, Disp-30 Nebule, R-0      predniSONE (STERAPRED DS) 10 mg dose pack See administration instruction per 10mg dose pack, Print, Disp-21 Tab, R-0      cholecalciferol, VITAMIN D3, (VITAMIN D3) 5,000 unit tab tablet Take 1 Tab by mouth daily. Indications: VITAMIN D DEFICIENCY (HIGH DOSE THERAPY), Print, Disp-90 Tab, R-3           Medication Rx was sent to pharmacy:   Cefpodoxime 200 mg PO BID for 7 days,   Azithromycin 500 mg PO Qday for 5 days and   Medrol pack     Follow up Care:    1. Eva Espino MD in 1-2 weeks. Please call to set up an appointment shortly after discharge. Diet:  Regular Diet    Disposition:  Home. Advanced Directive:   FULL X   DNR      Discharge Exam:  See today's note. CONSULTATIONS: None    Significant Diagnostic Studies:   12/31/2022: BUN 12 MG/DL (Ref range: 6 - 20 MG/DL); Calcium 8.7 MG/DL (Ref range: 8.5 - 10.1 MG/DL); CO2 29 mmol/L (Ref range: 21 - 32 mmol/L); Creatinine 0.84 MG/DL (Ref range: 0.70 - 1.30 MG/DL); Glucose 91 mg/dL (Ref range: 65 - 100 mg/dL); HCT 34.8 % (L; Ref range: 36.6 - 50.3 %); HGB 11.7 g/dL (L; Ref range: 12.1 - 17.0 g/dL); Potassium 3.6 mmol/L (Ref range: 3.5 - 5.1 mmol/L); Sodium 134 mmol/L (L; Ref range: 136 - 145 mmol/L)  1/1/2023: BUN 11 MG/DL (Ref range: 6 - 20 MG/DL); Calcium 8.5 MG/DL (Ref range: 8.5 - 10.1 MG/DL); CO2 28 mmol/L (Ref range: 21 - 32 mmol/L); Creatinine 0.69 MG/DL (L; Ref range: 0.70 - 1.30 MG/DL); Glucose 132 mg/dL (H; Ref range: 65 - 100 mg/dL); HCT 30.5 % (L; Ref range: 36.6 - 50.3 %); HGB 10.6 g/dL (L; Ref range: 12.1 - 17.0 g/dL); Potassium 4.1 mmol/L (Ref range: 3.5 - 5.1 mmol/L);  Sodium 135 mmol/L (L; Ref range: 136 - 145 mmol/L)  Recent Labs     01/01/23 0411 12/31/22  1517   WBC 10.7 11.9*   HGB 10.6* 11.7*   HCT 30.5* 34.8*    428*     Recent Labs     01/01/23 0411 12/31/22  1517   * 134*   K 4.1 3.6    96*   CO2 28 29   BUN 11 12   CREA 0.69* 0.84   * 91   CA 8.5 8.7   MG 2.0 1.7     Recent Labs     01/01/23 0411 12/31/22  1517   * 126*   TP 7.3 8.3*   ALB 2.4* 3.0*   GLOB 4.9* 5.3*     Recent Labs 01/01/23  0411 12/31/22  1517   INR 1.2* 1.1   PTP 11.8* 11.1      No results for input(s): FE, TIBC, PSAT, FERR in the last 72 hours. No results for input(s): PH, PCO2, PO2 in the last 72 hours. No results for input(s): CPK, CKMB in the last 72 hours. No lab exists for component: TROPONINI  No components found for: [place]      Total time to discharge patient was 31 minutes more than 50% of time was spent for counseling and coordination of care.     Signed:  Lindsay Mancilla MD  1/1/2023  10:06 AM

## 2023-01-03 LAB
BACTERIA SPEC CULT: NORMAL
GRAM STN SPEC: NORMAL
SERVICE CMNT-IMP: NORMAL

## 2023-01-05 LAB
BACTERIA SPEC CULT: NORMAL
BACTERIA SPEC CULT: NORMAL
SERVICE CMNT-IMP: NORMAL
SERVICE CMNT-IMP: NORMAL

## 2023-01-18 NOTE — PROGRESS NOTES
Physician Progress Note      Veto Pedroza  CSN #:                  [id number]  :                       1990  ADMIT DATE:       2022 3:00 PM  [address] [place] DATE:        2023 8:57 AM  RESPONDING  PROVIDER #:        Pam Finnegan MD          QUERY TEXT:    Patient presented with fever, tachycardia, Wbc 11.9 and CXR findings that may represent atypical/viral pneumonia. Noted documentation of sepsis due to pneumonia in H&P, progress notes, & DC Summary. In order to support the diagnosis of sepsis, please include additional clinical indicators in your documentation. Or please document if the diagnosis of sepsis has been ruled out after further study. The medical record reflects the following:    Risk Factors: 35yo male with chest tightness, wheezing, SOB, fever, chills, body aches, HA, & productive cough x6 days  Clinical Indicators:  -PMH: asthma, tobacco/marijuana use  -initial temp 101.7F that dropped to 100.4F in just over 1hr s/p dose of Tylenol & stayed afebrile the remainder of the admission  -initial HRs 120 - 117 - 101 that decreased to 98 - 95 - 86 - 72 in < 24hrs  -Wbc 11.9 - 10.7 / Neutrophils 73 - 82  -Lactic Acid 0.6 - 0.9  -CXR: diffuse prominence of pulmonary interstitium without focal airspace disease; may represent atypical/viral pneumonia  Treatment: x1 dose PO tylenol in ED, IV Zithromax & Rocephin (CAP), 3 liters LR bolus followed by LR at 75cc/hr ( - )    Thank you,  PHOEBE WarrenN, RN, CCDS, CRCR, CDI Supervisor  Email: [email or web address]  Also available via [employer] messaging  Options provided:  -- Sepsis present as evidenced by, Please document evidence.   -- Sepsis was ruled out after study  -- Other - I will add my own diagnosis  -- Disagree - Not applicable / Not valid  -- Disagree - Clinically unable to determine / Unknown  -- Refer to Clinical Documentation Reviewer    PROVIDER RESPONSE TEXT:    Sepsis is present as evidenced by pneumonia on CXR, initial temp 101.7F, -initial HRs 120 - 117, WBC 11.9    Query created by: Lashawn Harrington on 1/12/2023 7:13 PM      QUERY TEXT:    Patient presented with acute moderate chest tightness, wheezing, SOB, and productive cough. Noted documentation of acute hypoxic respiratory failure in ED, H&P, Progress Notes, & DC Summary. Clinical indicators of hypoxia without evidence of increased work of breathing. In order to support the diagnosis of acute respiratory failure, please include additional clinical indicators in your documentation. Or please document if the diagnosis of acute respiratory failure has been ruled out after further study. The medical record reflects the following:    Risk Factors: 33yo male with PMH asthma and tobacco/marijuana use  Clinical Indicators:  -Initial ED SpO2 88% on room air, RR 20 and exam w/ bilateral inspiratory & expiratory wheezing  -1/1 PN respiratory exam: coarse bilaterally, wheezing Lt > Rt  -Diagnoses: Mild intermittent asthma exacerbation, Bilateral Pneumonia, Acute Hypoxic Respiratory Failure  Treatment: 4LNC titrated down to [facility] before leaving AMA, IV solumedrol, Duonebs, no ABGs performed, IV rocephin & zithromax    Thank you,  Danna Zavala, PHOEBEN, RN, CCDS, CRCR, CDI Supervisor  Email: [email or web address]  Also available via [employer] messaging    Acute Respiratory Failure Clinical Indicators per [employer] Training Guide and Quick Reference Guide:  pO2 < 60 mmHg or SpO2 (pulse oximetry) < 91% breathing room air  pCO2 > 50 and pH < 7.35  P/F ratio (pO2 / FIO2) < 300  pO2 decrease or pCO2 increase by 10 mmHg from baseline (if known)  Supplemental oxygen of 40% or more  Presence of respiratory distress, tachypnea, dyspnea, shortness of breath, wheezing  Unable to speak in complete sentences  Use of accessory muscles to breathe  Extreme anxiety and feeling of impending doom  Tripod position  Confusion/altered mental status/obtunded  Options provided:  -- Acute Respiratory Failure as evidenced by, Please document evidence. -- Acute Respiratory Failure ruled out after study  -- Other - I will add my own diagnosis  -- Disagree - Not applicable / Not valid  -- Disagree - Clinically unable to determine / Unknown  -- Refer to Clinical Documentation Reviewer    PROVIDER RESPONSE TEXT:    This patient is in acute respiratory failure as evidenced by difficulty to breath, hypoxia    Query created by:  Fernanda Chinchilla on 1/12/2023 7:25 PM      Electronically signed by:  Elena Norris MD 1/18/2023 9:11 AM

## 2023-05-15 NOTE — DISCHARGE INSTRUCTIONS
You were evaluated in the emergency department for right leg pain and swelling. Your examination was reassuring as was your work-up including xrays, blood clot, and ultrasound. It will be important for you to follow-up with your primary care physician in 3-7 days. If you develop worsening symptoms such as fever, worsening pain or increased swelling/redness despite taking antibiotics, please return to the emergency department immediately.
no

## 2023-09-07 ENCOUNTER — APPOINTMENT (OUTPATIENT)
Facility: HOSPITAL | Age: 33
End: 2023-09-07
Payer: COMMERCIAL

## 2023-09-07 ENCOUNTER — HOSPITAL ENCOUNTER (EMERGENCY)
Facility: HOSPITAL | Age: 33
Discharge: HOME | End: 2023-09-07
Payer: COMMERCIAL

## 2023-09-07 VITALS
OXYGEN SATURATION: 98 % | DIASTOLIC BLOOD PRESSURE: 67 MMHG | BODY MASS INDEX: 25.48 KG/M2 | WEIGHT: 178 LBS | SYSTOLIC BLOOD PRESSURE: 125 MMHG | HEIGHT: 70 IN | HEART RATE: 96 BPM | RESPIRATION RATE: 18 BRPM | TEMPERATURE: 97.7 F

## 2023-09-07 DIAGNOSIS — R51.9 NONINTRACTABLE HEADACHE, UNSPECIFIED CHRONICITY PATTERN, UNSPECIFIED HEADACHE TYPE: ICD-10-CM

## 2023-09-07 DIAGNOSIS — M54.50 LOW BACK PAIN, UNSPECIFIED BACK PAIN LATERALITY, UNSPECIFIED CHRONICITY, UNSPECIFIED WHETHER SCIATICA PRESENT: ICD-10-CM

## 2023-09-07 DIAGNOSIS — Z04.1 ENCOUNTER FOR EXAMINATION FOLLOWING MOTOR VEHICLE COLLISION (MVC): Primary | ICD-10-CM

## 2023-09-07 PROCEDURE — 6370000000 HC RX 637 (ALT 250 FOR IP): Performed by: PHYSICIAN ASSISTANT

## 2023-09-07 PROCEDURE — 99283 EMERGENCY DEPT VISIT LOW MDM: CPT

## 2023-09-07 PROCEDURE — 72100 X-RAY EXAM L-S SPINE 2/3 VWS: CPT

## 2023-09-07 RX ORDER — LIDOCAINE 4 G/G
1 PATCH TOPICAL
Status: DISCONTINUED | OUTPATIENT
Start: 2023-09-07 | End: 2023-09-07 | Stop reason: HOSPADM

## 2023-09-07 RX ORDER — LIDOCAINE 50 MG/G
1 PATCH TOPICAL DAILY
Qty: 10 PATCH | Refills: 0 | Status: SHIPPED | OUTPATIENT
Start: 2023-09-07 | End: 2023-09-17

## 2023-09-07 RX ORDER — NAPROXEN 500 MG/1
500 TABLET ORAL 2 TIMES DAILY
Qty: 30 TABLET | Refills: 0 | Status: SHIPPED | OUTPATIENT
Start: 2023-09-07

## 2023-09-07 RX ORDER — NAPROXEN 250 MG/1
500 TABLET ORAL ONCE
Status: COMPLETED | OUTPATIENT
Start: 2023-09-07 | End: 2023-09-07

## 2023-09-07 RX ORDER — CYCLOBENZAPRINE HCL 10 MG
10 TABLET ORAL 3 TIMES DAILY PRN
Qty: 21 TABLET | Refills: 0 | Status: SHIPPED | OUTPATIENT
Start: 2023-09-07 | End: 2023-09-17

## 2023-09-07 RX ADMIN — NAPROXEN 500 MG: 250 TABLET ORAL at 12:25

## 2023-09-07 ASSESSMENT — PAIN DESCRIPTION - PAIN TYPE: TYPE: ACUTE PAIN

## 2023-09-07 ASSESSMENT — PAIN SCALES - GENERAL: PAINLEVEL_OUTOF10: 8

## 2023-09-07 ASSESSMENT — PAIN - FUNCTIONAL ASSESSMENT: PAIN_FUNCTIONAL_ASSESSMENT: 0-10

## 2023-09-07 ASSESSMENT — PAIN DESCRIPTION - LOCATION: LOCATION: BACK

## 2023-09-07 ASSESSMENT — PAIN DESCRIPTION - ORIENTATION: ORIENTATION: LOWER

## 2023-09-07 ASSESSMENT — PAIN DESCRIPTION - DESCRIPTORS: DESCRIPTORS: SHARP

## 2023-09-07 NOTE — DISCHARGE INSTRUCTIONS
Thank You! It was a pleasure taking care of you in our Emergency Department today. We know that when you come to [facility] you are entrusting us with your health, comfort, and safety. Our clinicians honor that trust, and truly appreciate the opportunity to care for you and your loved ones. We also value your feedback. If you receive a survey about your Emergency Department experience today, please fill it out. We care about our patients' feedback, and we listen to what you have to say. Thank you. Connie Paez PA-C    ____________________________________________________________________  I have included a copy of your lab results and/or radiologic studies from today's visit so you can have them easily available at your follow-up visit. No results found for this or any previous visit (from the past 12 hour(s)). XR LUMBAR SPINE (2-3 VIEWS)   Final Result   Normal lumbar spine views.                     @[facility]@

## 2023-09-07 NOTE — ED NOTES
Discharge instructions were given to the patient by Provider. The patient left the Emergency Department ambulatory, alert and oriented and in no acute distress with 3 prescriptions. The patient was encouraged to call or return to the ED for worsening issues or problems and was encouraged to schedule a follow up appointment for continuing care. The patient verbalized understanding of discharge instructions and prescriptions, all questions were answered. The patient has no further concerns at this time.         Henry Magdaleno RN  09/07/23 [id number]
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
